# Patient Record
Sex: MALE | Race: WHITE | NOT HISPANIC OR LATINO | Employment: UNEMPLOYED | URBAN - NONMETROPOLITAN AREA
[De-identification: names, ages, dates, MRNs, and addresses within clinical notes are randomized per-mention and may not be internally consistent; named-entity substitution may affect disease eponyms.]

---

## 2022-11-16 ENCOUNTER — OFFICE VISIT (OUTPATIENT)
Dept: FAMILY MEDICINE CLINIC | Facility: CLINIC | Age: 47
End: 2022-11-16

## 2022-11-16 VITALS
DIASTOLIC BLOOD PRESSURE: 75 MMHG | HEIGHT: 72 IN | SYSTOLIC BLOOD PRESSURE: 115 MMHG | WEIGHT: 231.7 LBS | OXYGEN SATURATION: 99 % | BODY MASS INDEX: 31.38 KG/M2 | HEART RATE: 79 BPM

## 2022-11-16 DIAGNOSIS — E78.2 MIXED HYPERLIPIDEMIA: ICD-10-CM

## 2022-11-16 DIAGNOSIS — F41.9 ANXIETY: ICD-10-CM

## 2022-11-16 DIAGNOSIS — F51.04 PSYCHOPHYSIOLOGICAL INSOMNIA: ICD-10-CM

## 2022-11-16 DIAGNOSIS — Z00.00 ROUTINE GENERAL MEDICAL EXAMINATION AT A HEALTH CARE FACILITY: Primary | ICD-10-CM

## 2022-11-16 DIAGNOSIS — E11.9 TYPE 2 DIABETES MELLITUS WITHOUT COMPLICATION, WITHOUT LONG-TERM CURRENT USE OF INSULIN: ICD-10-CM

## 2022-11-16 DIAGNOSIS — F10.20 ALCOHOLISM: ICD-10-CM

## 2022-11-16 DIAGNOSIS — K21.9 GASTROESOPHAGEAL REFLUX DISEASE WITHOUT ESOPHAGITIS: ICD-10-CM

## 2022-11-16 DIAGNOSIS — Z72.0 TOBACCO ABUSE: ICD-10-CM

## 2022-11-16 DIAGNOSIS — M1A.09X0 IDIOPATHIC CHRONIC GOUT OF MULTIPLE SITES WITHOUT TOPHUS: ICD-10-CM

## 2022-11-16 DIAGNOSIS — Z98.84 HISTORY OF BARIATRIC SURGERY: ICD-10-CM

## 2022-11-16 DIAGNOSIS — E53.8 B12 DEFICIENCY: ICD-10-CM

## 2022-11-16 PROCEDURE — 99386 PREV VISIT NEW AGE 40-64: CPT | Performed by: FAMILY MEDICINE

## 2022-11-16 PROCEDURE — 36415 COLL VENOUS BLD VENIPUNCTURE: CPT | Performed by: FAMILY MEDICINE

## 2022-11-16 PROCEDURE — 3008F BODY MASS INDEX DOCD: CPT | Performed by: FAMILY MEDICINE

## 2022-11-16 PROCEDURE — 2014F MENTAL STATUS ASSESS: CPT | Performed by: FAMILY MEDICINE

## 2022-11-16 RX ORDER — VARENICLINE TARTRATE 1 MG/1
1 TABLET, FILM COATED ORAL 2 TIMES DAILY
Qty: 56 TABLET | Refills: 1 | Status: SHIPPED | OUTPATIENT
Start: 2022-12-14 | End: 2023-02-08

## 2022-11-16 RX ORDER — ASPIRIN 81 MG
TABLET,CHEWABLE ORAL
COMMUNITY
Start: 2022-10-17

## 2022-11-16 RX ORDER — NICOTINE POLACRILEX 4 MG/1
GUM, CHEWING ORAL
COMMUNITY
End: 2023-01-04

## 2022-11-16 RX ORDER — ATORVASTATIN CALCIUM 80 MG/1
TABLET, FILM COATED ORAL
COMMUNITY
End: 2023-03-22 | Stop reason: SDUPTHER

## 2022-11-16 RX ORDER — TRAZODONE HYDROCHLORIDE 100 MG/1
100 TABLET ORAL NIGHTLY
Qty: 30 TABLET | Refills: 3 | Status: SHIPPED | OUTPATIENT
Start: 2022-11-16 | End: 2023-01-04

## 2022-11-16 RX ORDER — ALLOPURINOL 100 MG/1
TABLET ORAL
COMMUNITY
Start: 2022-10-17 | End: 2023-03-22 | Stop reason: SDUPTHER

## 2022-11-16 RX ORDER — BUSPIRONE HYDROCHLORIDE 15 MG/1
TABLET ORAL
COMMUNITY
Start: 2022-09-23 | End: 2023-03-22 | Stop reason: SDUPTHER

## 2022-11-16 RX ORDER — NALTREXONE HYDROCHLORIDE 50 MG/1
50 TABLET, FILM COATED ORAL DAILY
Qty: 30 TABLET | Refills: 3 | Status: SHIPPED | OUTPATIENT
Start: 2022-11-16 | End: 2023-03-22 | Stop reason: SDUPTHER

## 2022-11-16 NOTE — PROGRESS NOTES
"Chief Complaint  Annual Exam    Subjective          Chele Khan presents to [facility]  History of Present Illness  Patient comes in says he has long history of different medical problems and other difficulties he does have diabetes he has hyperlipidemia he does smoke he has a history of some back issues and muscular pains as well he also reports he is an alcoholic that basically has not taper down his drinking about 6 drinks a day he says he is says mostly at beer he says he was completely get off that he says the reason he drinks at night he keeps because he cannot sleep he said he said chronic insomnia he used to take trazodone which helped quite a bit but he has been out of it for a while and would like to restart that he is also basically says he continues to smoke and he would like to get to get some Chantix eventually to quit smoking as well he states that he would also like to get some blood work done he continues to see cardiology for his hyperlipidemia he reports another problems      Objective   Vital Signs:   /75   Pulse 79   Ht 182.9 cm (72\")   Wt 105 kg (231 lb 11.2 oz)   SpO2 99%   BMI 31.42 kg/m²     Body mass index is 31.42 kg/m².    Review of Systems   Constitutional: Negative.    HENT: Negative for congestion, dental problem, ear discharge, ear pain and sore throat.    Respiratory: Negative for apnea, chest tightness and shortness of breath.    Gastrointestinal: Negative for constipation and nausea.   Endocrine: Negative for polyuria.   Genitourinary: Negative for difficulty urinating.   Musculoskeletal: Positive for arthralgias. Negative for gait problem.   Skin: Negative for rash.   Hematological: Negative for adenopathy.   Psychiatric/Behavioral: Positive for sleep disturbance and depressed mood. The patient is nervous/anxious.        Past History:  Medical History: has a past medical history of Diabetes mellitus (HCC) and Hypertension.   Surgical " History: has a past surgical history that includes Cardiac catheterization; Coronary stent placement; and Bariatric Surgery.         Current Outpatient Medications:   •  allopurinol (ZYLOPRIM) 100 MG tablet, , Disp: , Rfl:   •  Aspirin Low Dose 81 MG chewable tablet, , Disp: , Rfl:   •  atorvastatin (LIPITOR) 80 MG tablet, , Disp: , Rfl:   •  busPIRone (BUSPAR) 15 MG tablet, , Disp: , Rfl:   •  metFORMIN (GLUCOPHAGE) 1000 MG tablet, , Disp: , Rfl:   •  Omeprazole 20 MG tablet delayed-release, , Disp: , Rfl:   •  naltrexone (DEPADE) 50 MG tablet, Take 1 tablet by mouth Daily., Disp: 30 tablet, Rfl: 3  •  traZODone (DESYREL) 100 MG tablet, Take 1 tablet by mouth Every Night., Disp: 30 tablet, Rfl: 3  •  [START ON 12/14/2022] varenicline (Chantix Continuing Month Benjamin) 1 MG tablet, Take 1 tablet by mouth 2 (Two) Times a Day for 56 days., Disp: 56 tablet, Rfl: 1    Allergies: Patient has no known allergies.    Physical Exam  Vitals reviewed.   Constitutional:       Appearance: Normal appearance.   HENT:      Head: Normocephalic.      Right Ear: Tympanic membrane, ear canal and external ear normal.      Left Ear: Tympanic membrane, ear canal and external ear normal.      Nose: Nose normal.      Mouth/Throat:      Pharynx: Oropharynx is clear.   Eyes:      Pupils: Pupils are equal, round, and reactive to light.   Cardiovascular:      Rate and Rhythm: Normal rate and regular rhythm.      Pulses: Normal pulses.   Pulmonary:      Effort: Pulmonary effort is normal.      Breath sounds: Normal breath sounds.      Comments: Expiratory wheezes  Abdominal:      General: Abdomen is flat. Bowel sounds are normal.      Palpations: Abdomen is soft.   Musculoskeletal:         General: Normal range of motion.   Skin:     General: Skin is warm and dry.   Neurological:      General: No focal deficit present.      Mental Status: He is alert and oriented to person, place, and time.          Result Review :                   Assessment and  Plan    Diagnoses and all orders for this visit:    1. Routine general medical examination at a health care facility (Primary)  Comments:  Patient comes in says he wants to quit smoking quit drinking get blood work done in establish care  Orders:  -     Comprehensive Metabolic Panel; Future    2. Mixed hyperlipidemia  Comments:  Cardiology taking medications we will get blood work  Orders:  -     Lipid Panel; Future    3. Gastroesophageal reflux disease without esophagitis  Comments:  Stable presently    4. Type 2 diabetes mellitus without complication, without long-term current use of insulin (HCC)  Comments:  Much improved since gastric bypass surgery still taking metformin we will get levels  Orders:  -     Hemoglobin A1c; Future    5. Idiopathic chronic gout of multiple sites without tophus  Comments:  Uric acid level and monitor  Orders:  -     Uric Acid; Future    6. Tobacco abuse  Comments:  Discussed smoking cessation we will try some Chantix to see how he does  Orders:  -     varenicline (Chantix Continuing Month Benjamin) 1 MG tablet; Take 1 tablet by mouth 2 (Two) Times a Day for 56 days.  Dispense: 56 tablet; Refill: 1    7. Alcoholism (HCC)  Comments:  Patient states he is never had significant seizures or withdrawal he has reduced his drinking down to 6 drinks a day we will go ahead and try some naltrexone   Orders:  -     CBC & Differential; Future  -     naltrexone (DEPADE) 50 MG tablet; Take 1 tablet by mouth Daily.  Dispense: 30 tablet; Refill: 3    8. Psychophysiological insomnia  Comments:  We will start some trazodone  Orders:  -     traZODone (DESYREL) 100 MG tablet; Take 1 tablet by mouth Every Night.  Dispense: 30 tablet; Refill: 3    9. Anxiety  Comments:  He is on as needed BuSpar    10. History of bariatric surgery  Comments:  Stable  Orders:  -     Vitamin B12; Future    11. B12 deficiency  Comments:  Replacing p.o. we will see what his levels is and go from there  Orders:  -     Vitamin B12;  Future              Follow Up   No follow-ups on file.  Patient was given instructions and counseling regarding his condition or for health maintenance advice. Please see specific information pulled into the AVS if appropriate.     Hany Cameron MD

## 2022-11-17 LAB
ALBUMIN SERPL-MCNC: 4.3 G/DL (ref 4–5)
ALBUMIN/GLOB SERPL: 1.9 {RATIO} (ref 1.2–2.2)
ALP SERPL-CCNC: 132 IU/L (ref 44–121)
ALT SERPL-CCNC: 53 IU/L (ref 0–44)
AST SERPL-CCNC: 73 IU/L (ref 0–40)
BASOPHILS # BLD AUTO: 0.1 X10E3/UL (ref 0–0.2)
BASOPHILS NFR BLD AUTO: 1 %
BILIRUB SERPL-MCNC: 0.5 MG/DL (ref 0–1.2)
BUN SERPL-MCNC: 8 MG/DL (ref 6–24)
BUN/CREAT SERPL: 11 (ref 9–20)
CALCIUM SERPL-MCNC: 8.9 MG/DL (ref 8.7–10.2)
CHLORIDE SERPL-SCNC: 105 MMOL/L (ref 96–106)
CHOLEST SERPL-MCNC: 137 MG/DL (ref 100–199)
CO2 SERPL-SCNC: 22 MMOL/L (ref 20–29)
CREAT SERPL-MCNC: 0.76 MG/DL (ref 0.76–1.27)
EGFRCR SERPLBLD CKD-EPI 2021: 112 ML/MIN/1.73
EOSINOPHIL # BLD AUTO: 0.3 X10E3/UL (ref 0–0.4)
EOSINOPHIL NFR BLD AUTO: 5 %
ERYTHROCYTE [DISTWIDTH] IN BLOOD BY AUTOMATED COUNT: 12.9 % (ref 11.6–15.4)
GLOBULIN SER CALC-MCNC: 2.3 G/DL (ref 1.5–4.5)
GLUCOSE SERPL-MCNC: 74 MG/DL (ref 70–99)
HBA1C MFR BLD: 5.1 % (ref 4.8–5.6)
HCT VFR BLD AUTO: 47.3 % (ref 37.5–51)
HDLC SERPL-MCNC: 46 MG/DL
HGB BLD-MCNC: 16 G/DL (ref 13–17.7)
IMM GRANULOCYTES # BLD AUTO: 0 X10E3/UL (ref 0–0.1)
IMM GRANULOCYTES NFR BLD AUTO: 0 %
LDLC SERPL CALC-MCNC: 68 MG/DL (ref 0–99)
LYMPHOCYTES # BLD AUTO: 2.9 X10E3/UL (ref 0.7–3.1)
LYMPHOCYTES NFR BLD AUTO: 49 %
MCH RBC QN AUTO: 33.1 PG (ref 26.6–33)
MCHC RBC AUTO-ENTMCNC: 33.8 G/DL (ref 31.5–35.7)
MCV RBC AUTO: 98 FL (ref 79–97)
MONOCYTES # BLD AUTO: 0.7 X10E3/UL (ref 0.1–0.9)
MONOCYTES NFR BLD AUTO: 12 %
NEUTROPHILS # BLD AUTO: 1.9 X10E3/UL (ref 1.4–7)
NEUTROPHILS NFR BLD AUTO: 33 %
PLATELET # BLD AUTO: 204 X10E3/UL (ref 150–450)
POTASSIUM SERPL-SCNC: 4.1 MMOL/L (ref 3.5–5.2)
PROT SERPL-MCNC: 6.6 G/DL (ref 6–8.5)
RBC # BLD AUTO: 4.84 X10E6/UL (ref 4.14–5.8)
SODIUM SERPL-SCNC: 143 MMOL/L (ref 134–144)
TRIGL SERPL-MCNC: 128 MG/DL (ref 0–149)
URATE SERPL-MCNC: 7.1 MG/DL (ref 3.8–8.4)
VIT B12 SERPL-MCNC: 727 PG/ML (ref 232–1245)
VLDLC SERPL CALC-MCNC: 23 MG/DL (ref 5–40)
WBC # BLD AUTO: 5.8 X10E3/UL (ref 3.4–10.8)

## 2023-01-04 ENCOUNTER — OFFICE VISIT (OUTPATIENT)
Dept: FAMILY MEDICINE CLINIC | Facility: CLINIC | Age: 48
End: 2023-01-04
Payer: COMMERCIAL

## 2023-01-04 VITALS — HEIGHT: 73 IN | WEIGHT: 221 LBS | BODY MASS INDEX: 29.29 KG/M2 | HEART RATE: 104 BPM | OXYGEN SATURATION: 98 %

## 2023-01-04 DIAGNOSIS — K29.20 ACUTE ALCOHOLIC GASTRITIS WITHOUT HEMORRHAGE: Primary | ICD-10-CM

## 2023-01-04 DIAGNOSIS — G47.00 PERSISTENT INSOMNIA: ICD-10-CM

## 2023-01-04 DIAGNOSIS — Z78.9 ALCOHOL USE: ICD-10-CM

## 2023-01-04 PROBLEM — M54.2 NECK PAIN: Status: ACTIVE | Noted: 2021-04-23

## 2023-01-04 PROBLEM — F10.90 ALCOHOL USE: Status: ACTIVE | Noted: 2023-01-04

## 2023-01-04 PROBLEM — M54.9 BACK PAIN: Status: ACTIVE | Noted: 2017-11-27

## 2023-01-04 PROCEDURE — 99213 OFFICE O/P EST LOW 20 MIN: CPT | Performed by: PHYSICIAN ASSISTANT

## 2023-01-04 RX ORDER — NALTREXONE HYDROCHLORIDE 50 MG/1
50 TABLET, FILM COATED ORAL 2 TIMES DAILY
Qty: 60 TABLET | Refills: 1 | Status: CANCELLED | OUTPATIENT
Start: 2023-01-04

## 2023-01-04 RX ORDER — FAMOTIDINE 40 MG/1
40 TABLET, FILM COATED ORAL NIGHTLY
Qty: 90 TABLET | Refills: 1 | Status: SHIPPED | OUTPATIENT
Start: 2023-01-04 | End: 2023-03-22 | Stop reason: SDUPTHER

## 2023-01-04 RX ORDER — QUETIAPINE FUMARATE 50 MG/1
50 TABLET, FILM COATED ORAL NIGHTLY
Qty: 30 TABLET | Refills: 3 | Status: SHIPPED | OUTPATIENT
Start: 2023-01-04 | End: 2023-03-22 | Stop reason: SDUPTHER

## 2023-01-04 RX ORDER — OMEPRAZOLE 40 MG/1
40 CAPSULE, DELAYED RELEASE ORAL DAILY
Qty: 90 CAPSULE | Refills: 3 | Status: SHIPPED | OUTPATIENT
Start: 2023-01-04 | End: 2023-03-22 | Stop reason: SDUPTHER

## 2023-01-04 NOTE — PROGRESS NOTES
Chief Complaint  Insomnia (Patient states the med Nisha put him on is not working.) and Abdominal Pain (X1 month)    Subjective          Chele Khan presents to [facility]  History of Present Illness patient states he is here because the medications are Dr. Cameron gave him for sleep are not working and he like to try something else.  He states that because he cannot sleep he has been drinking more again and he now thinks that if he could sleep he would not drink as much and that would help and quit drinking altogether.  He also has had epigastric abdominal pain for the past month because of his drinking.  He says he averages about 20 beers a day and if he tries to eat anything he has burning pain in his abdomen.  Food also makes him nauseated.  He quit taking the naltrexone because he did not think it was helping to curb his desire for the beer.  He denies drinking anything heavier than malted beer.  He denies any hematemesis or melena.    Objective   Vital Signs:   Pulse 104   Ht 185.4 cm (73\")   Wt 100 kg (221 lb)   SpO2 98%   BMI 29.16 kg/m²     Body mass index is 29.16 kg/m².    Review of Systems   Constitutional: Negative for fatigue.   Eyes: Negative for blurred vision.   Respiratory: Negative for cough, chest tightness and shortness of breath.    Cardiovascular: Negative for chest pain, palpitations and leg swelling.   Gastrointestinal: Positive for abdominal pain, nausea, GERD and indigestion. Negative for abdominal distention, blood in stool, constipation, diarrhea and vomiting.   Neurological: Negative for dizziness, tremors and headache.   Psychiatric/Behavioral: Negative for depressed mood. The patient is not nervous/anxious.        Past History:  Medical History: has a past medical history of Diabetes mellitus (HCC) and Hypertension.   Surgical History: has a past surgical history that includes Cardiac catheterization; Coronary stent placement; and Bariatric  Surgery.   Family History: family history is not on file.   Social History: reports that he has been smoking cigarettes. He started smoking about 38 years ago. He has a 30.00 pack-year smoking history. He has never used smokeless tobacco. He reports current alcohol use of about 145.0 standard drinks per week. He reports that he does not use drugs.      Current Outpatient Medications:   •  allopurinol (ZYLOPRIM) 100 MG tablet, , Disp: , Rfl:   •  Aspirin Low Dose 81 MG chewable tablet, , Disp: , Rfl:   •  atorvastatin (LIPITOR) 80 MG tablet, , Disp: , Rfl:   •  busPIRone (BUSPAR) 15 MG tablet, , Disp: , Rfl:   •  metFORMIN (GLUCOPHAGE) 1000 MG tablet, , Disp: , Rfl:   •  varenicline (Chantix Continuing Month Benjamin) 1 MG tablet, Take 1 tablet by mouth 2 (Two) Times a Day for 56 days., Disp: 56 tablet, Rfl: 1  •  famotidine (PEPCID) 40 MG tablet, Take 1 tablet by mouth Every Night., Disp: 90 tablet, Rfl: 1  •  naltrexone (DEPADE) 50 MG tablet, Take 1 tablet by mouth Daily., Disp: 30 tablet, Rfl: 3  •  omeprazole (priLOSEC) 40 MG capsule, Take 1 capsule by mouth Daily., Disp: 90 capsule, Rfl: 3  •  QUEtiapine (SEROquel) 50 MG tablet, Take 1 tablet by mouth Every Night., Disp: 30 tablet, Rfl: 3    Allergies: Patient has no known allergies.    Physical Exam  Vitals reviewed.   Constitutional:       Appearance: Normal appearance.   Cardiovascular:      Rate and Rhythm: Normal rate and regular rhythm.      Heart sounds: Normal heart sounds.   Pulmonary:      Effort: Pulmonary effort is normal.      Breath sounds: Normal breath sounds.   Abdominal:      General: Bowel sounds are normal. There is no distension.      Palpations: Abdomen is soft.      Tenderness: There is no abdominal tenderness. There is no guarding or rebound.      Comments: No signs of ascites.    Musculoskeletal:         General: Normal range of motion.   Neurological:      General: No focal deficit present.      Mental Status: He is alert and oriented to  person, place, and time.   Psychiatric:         Mood and Affect: Mood normal.          Result Review :                   Assessment and Plan    Diagnoses and all orders for this visit:    1. Acute alcoholic gastritis without hemorrhage (Primary)  Assessment & Plan:  I suspect his abdominal pain is from alcoholic gastritis I am increasing his omeprazole to 40 mg daily and adding famotidine at bedtime as well as referring him to gastroenterology for further evaluation.      Orders:  -     Ambulatory Referral to Gastroenterology    2. Persistent insomnia  Assessment & Plan:  I explained to him that the alcohol really is not helping him sleep and that it may be contributing to his sleep issues and that he has to drink enough to make himself pass out.  He does not think the trazodone helps at all so I agreed to switch to low-dose quetiapine which may help some of his anxiety but I told him the most important thing was going to be to get off the alcohol if he wants to get back to normal sleep pattern.      3. Alcohol use  Assessment & Plan:  Patient admits to excessive drinking he was placed on naltrexone to help with this but he stopped taking it.  I suggested that he resume the naltrexone and that he consider going to rehab again if he is serious about quitting.  Patient states that he will try the naltrexone again and if the changes we are making do not help then he will agree to seek a place for rehabilitation.      Other orders  -     omeprazole (priLOSEC) 40 MG capsule; Take 1 capsule by mouth Daily.  Dispense: 90 capsule; Refill: 3  -     QUEtiapine (SEROquel) 50 MG tablet; Take 1 tablet by mouth Every Night.  Dispense: 30 tablet; Refill: 3  -     famotidine (PEPCID) 40 MG tablet; Take 1 tablet by mouth Every Night.  Dispense: 90 tablet; Refill: 1      Follow Up   Return in about 4 weeks (around 2/1/2023) for with Dr. Cameron if possible.  Patient was given instructions and counseling regarding his condition or for  health maintenance advice. Please see specific information pulled into the AVS if appropriate.     Kimber Zacarias PA-C

## 2023-01-04 NOTE — ASSESSMENT & PLAN NOTE
I explained to him that the alcohol really is not helping him sleep and that it may be contributing to his sleep issues and that he has to drink enough to make himself pass out.  He does not think the trazodone helps at all so I agreed to switch to low-dose quetiapine which may help some of his anxiety but I told him the most important thing was going to be to get off the alcohol if he wants to get back to normal sleep pattern.

## 2023-01-04 NOTE — ASSESSMENT & PLAN NOTE
Patient admits to excessive drinking he was placed on naltrexone to help with this but he stopped taking it.  I suggested that he resume the naltrexone and that he consider going to rehab again if he is serious about quitting.  Patient states that he will try the naltrexone again and if the changes we are making do not help then he will agree to seek a place for rehabilitation.

## 2023-01-04 NOTE — ASSESSMENT & PLAN NOTE
I suspect his abdominal pain is from alcoholic gastritis I am increasing his omeprazole to 40 mg daily and adding famotidine at bedtime as well as referring him to gastroenterology for further evaluation.

## 2023-03-16 DIAGNOSIS — F10.20 ALCOHOLISM: ICD-10-CM

## 2023-03-16 RX ORDER — NALTREXONE HYDROCHLORIDE 50 MG/1
50 TABLET, FILM COATED ORAL DAILY
Qty: 30 TABLET | Refills: 3 | OUTPATIENT
Start: 2023-03-16

## 2023-03-16 RX ORDER — ATORVASTATIN CALCIUM 80 MG/1
TABLET, FILM COATED ORAL
Qty: 90 TABLET | OUTPATIENT
Start: 2023-03-16

## 2023-03-16 RX ORDER — FAMOTIDINE 40 MG/1
40 TABLET, FILM COATED ORAL NIGHTLY
Qty: 90 TABLET | Refills: 1 | OUTPATIENT
Start: 2023-03-16

## 2023-03-16 RX ORDER — BUSPIRONE HYDROCHLORIDE 15 MG/1
TABLET ORAL
OUTPATIENT
Start: 2023-03-16

## 2023-03-16 RX ORDER — QUETIAPINE FUMARATE 50 MG/1
50 TABLET, FILM COATED ORAL NIGHTLY
Qty: 30 TABLET | Refills: 3 | OUTPATIENT
Start: 2023-03-16

## 2023-03-16 RX ORDER — ALLOPURINOL 100 MG/1
TABLET ORAL
OUTPATIENT
Start: 2023-03-16

## 2023-03-16 RX ORDER — OMEPRAZOLE 40 MG/1
40 CAPSULE, DELAYED RELEASE ORAL DAILY
Qty: 90 CAPSULE | Refills: 3 | OUTPATIENT
Start: 2023-03-16

## 2023-03-16 NOTE — TELEPHONE ENCOUNTER
Caller: Chele Khan    Relationship: Self    Best call back number: [phone]    Requested Prescriptions:   Requested Prescriptions     Pending Prescriptions Disp Refills   • allopurinol (ZYLOPRIM) 100 MG tablet     • metFORMIN (GLUCOPHAGE) 1000 MG tablet     • busPIRone (BUSPAR) 15 MG tablet     • famotidine (PEPCID) 40 MG tablet 90 tablet 1     Sig: Take 1 tablet by mouth Every Night.   • atorvastatin (LIPITOR) 80 MG tablet 90 tablet    • naltrexone (DEPADE) 50 MG tablet 30 tablet 3     Sig: Take 1 tablet by mouth Daily.   • omeprazole (priLOSEC) 40 MG capsule 90 capsule 3     Sig: Take 1 capsule by mouth Daily.   • QUEtiapine (SEROquel) 50 MG tablet 30 tablet 3     Sig: Take 1 tablet by mouth Every Night.      METOPROLOL 25 MG TAKEN 2 TIMES PER DAY  TIZANADINE  2 MG TAKEN 3 TIMES PER DAY    Pharmacy where request should be sent: [address] - [phone] [facility] [phone] FX     Additional details provided by patient: PLEASE REFILL AS 90 DAY SUPPLY WITH REFILLS    Does the patient have less than a 3 day supply:  [x] Yes  [] No    Would you like a call back once the refill request has been completed: [] Yes [] No    If the office needs to give you a call back, can they leave a voicemail: [] Yes [] No    Ernst Viramontes   03/16/23 15:06 EDT

## 2023-03-22 ENCOUNTER — TELEPHONE (OUTPATIENT)
Dept: FAMILY MEDICINE CLINIC | Facility: CLINIC | Age: 48
End: 2023-03-22
Payer: COMMERCIAL

## 2023-03-22 DIAGNOSIS — F10.20 ALCOHOLISM: ICD-10-CM

## 2023-03-22 RX ORDER — ATORVASTATIN CALCIUM 80 MG/1
80 TABLET, FILM COATED ORAL NIGHTLY
Qty: 60 TABLET | Refills: 0 | Status: SHIPPED | OUTPATIENT
Start: 2023-03-22

## 2023-03-22 RX ORDER — CEPHALEXIN 500 MG/1
CAPSULE ORAL
COMMUNITY
Start: 2023-02-27

## 2023-03-22 RX ORDER — OMEPRAZOLE 40 MG/1
40 CAPSULE, DELAYED RELEASE ORAL DAILY
Qty: 90 CAPSULE | Refills: 3 | Status: SHIPPED | OUTPATIENT
Start: 2023-03-22

## 2023-03-22 RX ORDER — QUETIAPINE FUMARATE 50 MG/1
50 TABLET, FILM COATED ORAL NIGHTLY
Qty: 30 TABLET | Refills: 3 | Status: SHIPPED | OUTPATIENT
Start: 2023-03-22

## 2023-03-22 RX ORDER — NALTREXONE HYDROCHLORIDE 50 MG/1
50 TABLET, FILM COATED ORAL DAILY
Qty: 30 TABLET | Refills: 3 | Status: SHIPPED | OUTPATIENT
Start: 2023-03-22

## 2023-03-22 RX ORDER — BUSPIRONE HYDROCHLORIDE 15 MG/1
15 TABLET ORAL DAILY
Qty: 30 TABLET | Refills: 0 | Status: SHIPPED | OUTPATIENT
Start: 2023-03-22

## 2023-03-22 RX ORDER — FAMOTIDINE 40 MG/1
40 TABLET, FILM COATED ORAL NIGHTLY
Qty: 90 TABLET | Refills: 1 | Status: SHIPPED | OUTPATIENT
Start: 2023-03-22

## 2023-03-22 RX ORDER — ALLOPURINOL 100 MG/1
100 TABLET ORAL DAILY
Qty: 60 TABLET | Refills: 0 | Status: SHIPPED | OUTPATIENT
Start: 2023-03-22

## 2023-03-22 NOTE — TELEPHONE ENCOUNTER
WIFE CALLED AND SAID HE'S OUT OF HIS MEDICATIONS. SHE SAID SHE'S GIVEN THE LIST OF MEDS HE NEEDS WITH SOMEONE HERE 3 TIMES THIS WEEK AND WOULD NOT TELL ME WHAT HE NEEDS. HE SCHEDULED AN APPOINTMENT FOR 4-6-2023. HE IS COMPLETELY OUT OF HIS MEDS.

## 2023-04-06 ENCOUNTER — TELEPHONE (OUTPATIENT)
Dept: FAMILY MEDICINE CLINIC | Facility: CLINIC | Age: 48
End: 2023-04-06

## 2023-04-06 NOTE — TELEPHONE ENCOUNTER
CRAIG PATIENT  Called and talked to Anne, explained no more refills will be called in until patient makes and keeps an office visit with Dr Cameron.

## 2023-04-17 ENCOUNTER — TELEPHONE (OUTPATIENT)
Dept: FAMILY MEDICINE CLINIC | Facility: CLINIC | Age: 48
End: 2023-04-17

## 2023-04-17 ENCOUNTER — TELEPHONE (OUTPATIENT)
Dept: FAMILY MEDICINE CLINIC | Facility: CLINIC | Age: 48
End: 2023-04-17
Payer: COMMERCIAL

## 2023-04-17 NOTE — TELEPHONE ENCOUNTER
Caller: BRIANNE VELEZ    Relationship: Emergency Contact    Best call back number: [phone]    What medication are you requesting: PAIN MEDICATION FOR DVT    What are your current symptoms: RIGHT LEG PAIN    How long have you been experiencing symptoms: 2 WEEKS    Have you had these symptoms before:    [] Yes  [x] No    Have you been treated for these symptoms before:   [] Yes  [x] No    If a prescription is needed, what is your preferred pharmacy and phone number: [address] [phone] [facility] [phone] FX     Additional notes: THE PATIENT WENT TO [facility] ON  4.15.23 AND WAS DIAGNOSED WITH A BLOOD CLOT.     PLEASE CALL TO [facility] AS SOON AS POSSIBLE.     THANK YOU.

## 2023-04-17 NOTE — TELEPHONE ENCOUNTER
Wife called and said she thinks he needs to be seen before the 28th for the blood clot  And he is in a lot of pain and nsaids are not working  She is wanting a call back

## 2023-04-18 NOTE — TELEPHONE ENCOUNTER
PT SPOUSE CALLED TO CHECK ON STATUS FOR MED REQUEST, PT HAS NOT HEARD BACK FROM ANYONE.    PLEASE ADVISE.CALL BACK:[phone]

## 2023-04-24 ENCOUNTER — OFFICE VISIT (OUTPATIENT)
Dept: FAMILY MEDICINE CLINIC | Facility: CLINIC | Age: 48
End: 2023-04-24
Payer: COMMERCIAL

## 2023-04-24 VITALS
HEIGHT: 73 IN | HEART RATE: 68 BPM | OXYGEN SATURATION: 99 % | BODY MASS INDEX: 31.45 KG/M2 | SYSTOLIC BLOOD PRESSURE: 130 MMHG | WEIGHT: 237.3 LBS | DIASTOLIC BLOOD PRESSURE: 75 MMHG

## 2023-04-24 DIAGNOSIS — M79.604 RIGHT LEG PAIN: ICD-10-CM

## 2023-04-24 DIAGNOSIS — I82.431 ACUTE DEEP VEIN THROMBOSIS (DVT) OF POPLITEAL VEIN OF RIGHT LOWER EXTREMITY: Primary | ICD-10-CM

## 2023-04-24 DIAGNOSIS — E11.9 TYPE 2 DIABETES MELLITUS WITHOUT COMPLICATION, WITHOUT LONG-TERM CURRENT USE OF INSULIN: ICD-10-CM

## 2023-04-24 DIAGNOSIS — F10.20 ALCOHOLISM: ICD-10-CM

## 2023-04-24 RX ORDER — RIVAROXABAN 20 MG/1
TABLET, FILM COATED ORAL
COMMUNITY
Start: 2023-04-16

## 2023-04-24 RX ORDER — GABAPENTIN 100 MG/1
100 CAPSULE ORAL 3 TIMES DAILY
Qty: 90 CAPSULE | Refills: 0 | Status: SHIPPED | OUTPATIENT
Start: 2023-04-24

## 2023-05-04 ENCOUNTER — OFFICE VISIT (OUTPATIENT)
Dept: FAMILY MEDICINE CLINIC | Facility: CLINIC | Age: 48
End: 2023-05-04
Payer: COMMERCIAL

## 2023-05-04 VITALS
OXYGEN SATURATION: 99 % | HEIGHT: 72 IN | WEIGHT: 236 LBS | HEART RATE: 72 BPM | SYSTOLIC BLOOD PRESSURE: 118 MMHG | DIASTOLIC BLOOD PRESSURE: 60 MMHG | BODY MASS INDEX: 31.97 KG/M2

## 2023-05-04 DIAGNOSIS — E11.9 TYPE 2 DIABETES MELLITUS WITHOUT COMPLICATION, WITHOUT LONG-TERM CURRENT USE OF INSULIN: ICD-10-CM

## 2023-05-04 DIAGNOSIS — M79.604 RIGHT LEG PAIN: ICD-10-CM

## 2023-05-04 DIAGNOSIS — I82.431 ACUTE DEEP VEIN THROMBOSIS (DVT) OF POPLITEAL VEIN OF RIGHT LOWER EXTREMITY: Primary | ICD-10-CM

## 2023-05-04 DIAGNOSIS — H93.13 TINNITUS OF BOTH EARS: ICD-10-CM

## 2023-05-04 DIAGNOSIS — F10.20 ALCOHOLISM: ICD-10-CM

## 2023-05-04 RX ORDER — GABAPENTIN 100 MG/1
100 CAPSULE ORAL 3 TIMES DAILY
Qty: 90 CAPSULE | Refills: 3 | Status: SHIPPED | OUTPATIENT
Start: 2023-05-04

## 2023-05-04 NOTE — PROGRESS NOTES
"Chief Complaint  Tinnitus (/) and Follow-up on blood clot    Subjective          Chele Khan presents to [facility]  History of Present Illness  Patient comes in today saying his leg pain and things is much improved he is transitioning over to his once a day of his blood thinners he says that the pain in the legs got a lot better the swelling is gotten a lot better and he states the gabapentin has helped he says he has noticed that he has had a lot of tenderness that he has had for some period of time and recently says he thinks it may have gotten worse the second thing is that he does periodically get drawing in his toes and having some numb sensation they are mostly in his toes bilaterally.  Patient states he does continue to drink but he has decreased his mild consumption by quite a bit he states otherwise he has been doing relatively well  Tinnitus  Associated symptoms include arthralgias and numbness. Pertinent negatives include no congestion, nausea, rash or sore throat.       Objective   Vital Signs:   /60 (BP Location: Left arm, Patient Position: Sitting, Cuff Size: Adult)   Pulse 72   Ht 182.9 cm (72\")   Wt 107 kg (236 lb)   SpO2 99%   BMI 32.01 kg/m²     Body mass index is 32.01 kg/m².    Review of Systems   Constitutional: Negative.    HENT: Positive for tinnitus. Negative for congestion, dental problem, ear discharge, ear pain and sore throat.    Respiratory: Negative for apnea, chest tightness and shortness of breath.    Gastrointestinal: Negative for constipation and nausea.   Endocrine: Negative for polyuria.   Genitourinary: Negative for difficulty urinating.   Musculoskeletal: Positive for arthralgias. Negative for gait problem.   Skin: Negative for rash.   Neurological: Positive for numbness.   Hematological: Negative for adenopathy.       Past History:  Medical History: has a past medical history of Diabetes mellitus and Hypertension.   Surgical " History: has a past surgical history that includes Cardiac catheterization; Coronary stent placement; and Bariatric Surgery.         Current Outpatient Medications:   •  gabapentin (NEURONTIN) 100 MG capsule, Take 1 capsule by mouth 3 (Three) Times a Day., Disp: 90 capsule, Rfl: 3  •  allopurinol (ZYLOPRIM) 100 MG tablet, Take 1 tablet by mouth Daily., Disp: 60 tablet, Rfl: 0  •  Aspirin Low Dose 81 MG chewable tablet, , Disp: , Rfl:   •  atorvastatin (LIPITOR) 80 MG tablet, Take 1 tablet by mouth Every Night., Disp: 60 tablet, Rfl: 0  •  busPIRone (BUSPAR) 15 MG tablet, Take 1 tablet by mouth Daily., Disp: 30 tablet, Rfl: 0  •  famotidine (PEPCID) 40 MG tablet, Take 1 tablet by mouth Every Night., Disp: 90 tablet, Rfl: 1  •  metFORMIN (GLUCOPHAGE) 1000 MG tablet, Take 1 tablet by mouth 2 (Two) Times a Day With Meals., Disp: 60 tablet, Rfl: 0  •  metoprolol tartrate (LOPRESSOR) 25 MG tablet, Take 1 tablet by mouth 2 (Two) Times a Day., Disp: 60 tablet, Rfl: 0  •  naltrexone (DEPADE) 50 MG tablet, Take 1 tablet by mouth Daily., Disp: 30 tablet, Rfl: 3  •  omeprazole (priLOSEC) 40 MG capsule, Take 1 capsule by mouth Daily., Disp: 90 capsule, Rfl: 3  •  QUEtiapine (SEROquel) 50 MG tablet, Take 1 tablet by mouth Every Night., Disp: 30 tablet, Rfl: 3  •  Xarelto 20 MG tablet, TAKE ONE TABLET BY MOUTH DAILY WITH FOOD. START AFTER FINISHING 15 MG DOSE., Disp: , Rfl:     Allergies: Patient has no known allergies.    Physical Exam  Constitutional:       Appearance: Normal appearance.   HENT:      Head: Normocephalic.      Right Ear: Tympanic membrane, ear canal and external ear normal.      Left Ear: Tympanic membrane, ear canal and external ear normal.      Nose: Nose normal.      Mouth/Throat:      Pharynx: Oropharynx is clear.   Eyes:      Pupils: Pupils are equal, round, and reactive to light.   Cardiovascular:      Rate and Rhythm: Normal rate and regular rhythm.      Pulses: Normal pulses.   Pulmonary:      Effort:  Pulmonary effort is normal.      Breath sounds: Normal breath sounds.   Abdominal:      General: Abdomen is flat. Bowel sounds are normal.      Palpations: Abdomen is soft.   Musculoskeletal:         General: Normal range of motion.   Skin:     General: Skin is warm and dry.   Neurological:      General: No focal deficit present.      Mental Status: He is alert and oriented to person, place, and time.          Result Review :                   Assessment and Plan    Diagnoses and all orders for this visit:    1. Acute deep vein thrombosis (DVT) of popliteal vein of right lower extremity (Primary)  Comments:  Continue Xarelto his leg is improving we will monitor    2. Type 2 diabetes mellitus without complication, without long-term current use of insulin  Comments:  Stable once again discussed reduction in alcohol    3. Alcoholism  Comments:  The patient states he has reduced his drinking quite a bit discussed basically stopping    4. Tinnitus of both ears  Comments:  Insurance ears appear to be normal today on exam    5. Right leg pain  Comments:  Much improved continue his gabapentin and monitor  Orders:  -     gabapentin (NEURONTIN) 100 MG capsule; Take 1 capsule by mouth 3 (Three) Times a Day.  Dispense: 90 capsule; Refill: 3              Follow Up   Return in about 4 months (around 9/4/2023).  Patient was given instructions and counseling regarding his condition or for health maintenance advice. Please see specific information pulled into the AVS if appropriate.     Hany Cameron MD

## 2023-05-15 RX ORDER — BUSPIRONE HYDROCHLORIDE 15 MG/1
TABLET ORAL
Qty: 30 TABLET | Refills: 0 | Status: SHIPPED | OUTPATIENT
Start: 2023-05-15

## 2023-05-19 RX ORDER — ATORVASTATIN CALCIUM 80 MG/1
TABLET, FILM COATED ORAL
Qty: 60 TABLET | Refills: 0 | Status: SHIPPED | OUTPATIENT
Start: 2023-05-19

## 2023-06-13 NOTE — TELEPHONE ENCOUNTER
Caller: Chele Khan    Relationship: Self    Best call back number: [phone]    Requested Prescriptions:   Requested Prescriptions     Pending Prescriptions Disp Refills   • metoprolol tartrate (LOPRESSOR) 25 MG tablet 60 tablet 0     Sig: Take 1 tablet by mouth 2 (Two) Times a Day.        Pharmacy where request should be sent: [id number] - [place], [place] - [address] - [phone] [facility] [phone]      Last office visit with prescribing clinician: 5/4/2023   Last telemedicine visit with prescribing clinician: Visit date not found   Next office visit with prescribing clinician: Visit date not found     Additional details provided by patient: PATIENT STATES HE HAS BEEN PUT OF MEDICATION FOR 2 DAYS.     PATIENT STATES FOLLOWING THE CLOSURE OF [facility] HE NOW NEEDS HIS [facility].     Ernst Ramírez Rep   06/13/23 15:39 EDT

## 2023-07-26 RX ORDER — FAMOTIDINE 40 MG/1
40 TABLET, FILM COATED ORAL NIGHTLY
Qty: 90 TABLET | Refills: 1 | Status: SHIPPED | OUTPATIENT
Start: 2023-07-26

## 2023-07-26 NOTE — TELEPHONE ENCOUNTER
Caller: Chele Khan    Relationship: Self    Best call back number: [phone]     Requested Prescriptions:   Requested Prescriptions     Pending Prescriptions Disp Refills    metoprolol tartrate (LOPRESSOR) 25 MG tablet 60 tablet 0     Sig: Take 1 tablet by mouth 2 (Two) Times a Day.    famotidine (PEPCID) 40 MG tablet 90 tablet 1     Sig: Take 1 tablet by mouth Every Night.        Pharmacy where request should be sent: [id number] [place], [place] - [address] [place] [address] AT [facility] [address] - [phone]  - [phone]      Last office visit with prescribing clinician: 5/4/2023   Last telemedicine visit with prescribing clinician: Visit date not found   Next office visit with prescribing clinician: Visit date not found     Additional details provided by patient:   PATIENT IS COMPLETLEY OUT OF MEDICATION AND HAS BEEN FOR TWO WEEKS NOW     Does the patient have less than a 3 day supply:  [x] Yes  [] No    Would you like a call back once the refill request has been completed: [] Yes [x] No    If the office needs to give you a call back, can they leave a voicemail: [] Yes [x] No    Ernst Edmond   07/26/23 16:04 EDT

## 2023-08-01 RX ORDER — ATORVASTATIN CALCIUM 80 MG/1
TABLET, FILM COATED ORAL
Qty: 60 TABLET | Refills: 0 | Status: SHIPPED | OUTPATIENT
Start: 2023-08-01

## 2023-08-15 ENCOUNTER — TELEPHONE (OUTPATIENT)
Dept: FAMILY MEDICINE CLINIC | Facility: CLINIC | Age: 48
End: 2023-08-15

## 2023-08-15 NOTE — TELEPHONE ENCOUNTER
Left voicemail for patient to call back, please let him that we dont have eliquis on active medication list

## 2023-08-15 NOTE — TELEPHONE ENCOUNTER
Patient has appointment scheduled tomorrow for follow up fot DVT, patient states he has no insurance and would like to know if we have any samples of eliquis.

## 2023-08-16 ENCOUNTER — OFFICE VISIT (OUTPATIENT)
Dept: FAMILY MEDICINE CLINIC | Facility: CLINIC | Age: 48
End: 2023-08-16
Payer: MEDICAID

## 2023-08-16 ENCOUNTER — TELEPHONE (OUTPATIENT)
Dept: FAMILY MEDICINE CLINIC | Facility: CLINIC | Age: 48
End: 2023-08-16

## 2023-08-16 VITALS
DIASTOLIC BLOOD PRESSURE: 90 MMHG | HEART RATE: 96 BPM | SYSTOLIC BLOOD PRESSURE: 130 MMHG | TEMPERATURE: 98.4 F | WEIGHT: 238.5 LBS | BODY MASS INDEX: 32.3 KG/M2 | HEIGHT: 72 IN | OXYGEN SATURATION: 99 %

## 2023-08-16 DIAGNOSIS — E78.2 MIXED HYPERLIPIDEMIA: ICD-10-CM

## 2023-08-16 DIAGNOSIS — G47.00 PERSISTENT INSOMNIA: ICD-10-CM

## 2023-08-16 DIAGNOSIS — Z13.29 THYROID DISORDER SCREEN: ICD-10-CM

## 2023-08-16 DIAGNOSIS — F41.9 ANXIETY: ICD-10-CM

## 2023-08-16 DIAGNOSIS — Z78.9 ALCOHOL USE: ICD-10-CM

## 2023-08-16 DIAGNOSIS — E11.9 TYPE 2 DIABETES MELLITUS WITHOUT COMPLICATION, WITHOUT LONG-TERM CURRENT USE OF INSULIN: ICD-10-CM

## 2023-08-16 DIAGNOSIS — R05.2 SUBACUTE COUGH: ICD-10-CM

## 2023-08-16 DIAGNOSIS — I82.409 RECURRENT DEEP VEIN THROMBOSIS (DVT): Primary | ICD-10-CM

## 2023-08-16 PROCEDURE — 36415 COLL VENOUS BLD VENIPUNCTURE: CPT | Performed by: PHYSICIAN ASSISTANT

## 2023-08-16 PROCEDURE — 99214 OFFICE O/P EST MOD 30 MIN: CPT | Performed by: PHYSICIAN ASSISTANT

## 2023-08-16 PROCEDURE — 3044F HG A1C LEVEL LT 7.0%: CPT | Performed by: PHYSICIAN ASSISTANT

## 2023-08-16 PROCEDURE — 1159F MED LIST DOCD IN RCRD: CPT | Performed by: PHYSICIAN ASSISTANT

## 2023-08-16 PROCEDURE — 1160F RVW MEDS BY RX/DR IN RCRD: CPT | Performed by: PHYSICIAN ASSISTANT

## 2023-08-16 RX ORDER — BUSPIRONE HYDROCHLORIDE 15 MG/1
15 TABLET ORAL DAILY
Qty: 30 TABLET | Refills: 0 | Status: SHIPPED | OUTPATIENT
Start: 2023-08-16

## 2023-08-16 RX ORDER — RIVAROXABAN 20 MG/1
20 TABLET, FILM COATED ORAL
Qty: 30 TABLET | Refills: 2 | Status: SHIPPED | OUTPATIENT
Start: 2023-09-06 | End: 2023-08-21

## 2023-08-16 RX ORDER — ATORVASTATIN CALCIUM 80 MG/1
80 TABLET, FILM COATED ORAL
Qty: 60 TABLET | Refills: 2 | Status: SHIPPED | OUTPATIENT
Start: 2023-08-16

## 2023-08-16 RX ORDER — WARFARIN SODIUM 5 MG/1
5 TABLET ORAL NIGHTLY
Qty: 30 TABLET | Refills: 0 | Status: SHIPPED | OUTPATIENT
Start: 2023-08-16

## 2023-08-16 RX ORDER — QUETIAPINE FUMARATE 50 MG/1
50 TABLET, FILM COATED ORAL NIGHTLY
Qty: 30 TABLET | Refills: 3 | Status: SHIPPED | OUTPATIENT
Start: 2023-08-16

## 2023-08-16 NOTE — PROGRESS NOTES
Office Note     Name: Chele Khan    : 1975     MRN: [id number]     Chief Complaint  Hospital Follow Up Visit    Subjective     History of Present Illness:  Chele Khan is a 48 y.o. male who presents today for eval after ER visit to [facility] on 2023 for left lower extremity edema.  He states that he was diagnosed with a DVT, which is the second 1 that he has had.  He states that he had RLE DVT 3 to 4 months ago, and was placed on Xarelto for 3 months.  He states that he did well on the medication, but it was stopped after 3 months.  He then developed this DVT.  He states that they prescribed him Eliquis in the ER, but he was unable to get the medication at the pharmacy.  His wife states that they told him they would have to order it.  He denies any known family history of clotting disorders.  He denies any personal history of cancer.    He admits that he is still drinking alcohol, but he is down to 4-5 beers at night.  He has been to rehab and attended AA meetings, but he is having a hard time completely stopping the alcohol.  He states that the naltrexone has helped significantly.  He states that he has been drinking the beers at night to help him sleep when he thinks that he could quit completely if he had something that would help him sleep.  He admits that he has not had the Seroquel in quite some time, but he is not sure why not.  He states that he does not recall having any problems with the medication.    Past Medical History:   Past Medical History:   Diagnosis Date    Diabetes mellitus     Hypertension        Past Surgical History:   Past Surgical History:   Procedure Laterality Date    BARIATRIC SURGERY      CARDIAC CATHETERIZATION      CORONARY STENT PLACEMENT         Family History: History reviewed. No pertinent family history.    Social History:   Social History     Socioeconomic History    Marital status:    Tobacco Use    Smoking status: Every Day     Packs/day: 2.00      "Years: 15.00     Pack years: 30.00     Types: Cigarettes     Start date: 1985    Smokeless tobacco: Never   Vaping Use    Vaping Use: Never used   Substance and Sexual Activity    Alcohol use: Yes     Alcohol/week: 145.0 standard drinks     Types: 145 Cans of beer per week    Drug use: Never    Sexual activity: Defer       Immunizations:   Immunization History   Administered Date(s) Administered    COVID-19 (PFIZER) Purple Cap Monovalent 05/13/2021, 06/03/2021        Medications:     Current Outpatient Medications:     allopurinol (ZYLOPRIM) 100 MG tablet, Take 1 tablet by mouth Daily., Disp: 60 tablet, Rfl: 0    Aspirin Low Dose 81 MG chewable tablet, , Disp: , Rfl:     atorvastatin (LIPITOR) 80 MG tablet, Take 1 tablet by mouth every night at bedtime., Disp: 60 tablet, Rfl: 2    busPIRone (BUSPAR) 15 MG tablet, Take 1 tablet by mouth Daily., Disp: 30 tablet, Rfl: 0    famotidine (PEPCID) 40 MG tablet, Take 1 tablet by mouth Every Night., Disp: 90 tablet, Rfl: 1    gabapentin (NEURONTIN) 100 MG capsule, Take 1 capsule by mouth 3 (Three) Times a Day., Disp: 90 capsule, Rfl: 3    metFORMIN (GLUCOPHAGE) 1000 MG tablet, TAKE 1 TABLET BY MOUTH TWICE DAILY WITH MEALS, Disp: 180 tablet, Rfl: 0    metoprolol tartrate (LOPRESSOR) 25 MG tablet, Take 1 tablet by mouth 2 (Two) Times a Day., Disp: 60 tablet, Rfl: 0    naltrexone (DEPADE) 50 MG tablet, Take 1 tablet by mouth Daily., Disp: 30 tablet, Rfl: 3    omeprazole (priLOSEC) 40 MG capsule, Take 1 capsule by mouth Daily., Disp: 90 capsule, Rfl: 3    QUEtiapine (SEROquel) 50 MG tablet, Take 1 tablet by mouth Every Night., Disp: 30 tablet, Rfl: 3    warfarin (Coumadin) 5 MG tablet, Take 1 tablet by mouth Every Night., Disp: 30 tablet, Rfl: 0    Allergies:   No Known Allergies    Objective     Vital Signs  /90 (BP Location: Right arm, Patient Position: Sitting, Cuff Size: Large Adult)   Pulse 96   Temp 98.4 øF (36.9 øC) (Temporal)   Ht 182.9 cm (72\")   Wt 108 kg (238 " "lb 8 oz)   SpO2 99%   BMI 32.35 kg/mý   Estimated body mass index is 32.35 kg/mý as calculated from the following:    Height as of this encounter: 182.9 cm (72\").    Weight as of this encounter: 108 kg (238 lb 8 oz).      Physical Exam  Vitals and nursing note reviewed.   Constitutional:       General: He is not in acute distress.     Appearance: Normal appearance. He is not ill-appearing.   HENT:      Head: Normocephalic and atraumatic.   Eyes:      General: No scleral icterus.     Extraocular Movements: Extraocular movements intact.      Conjunctiva/sclera: Conjunctivae normal.      Pupils: Pupils are equal, round, and reactive to light.   Cardiovascular:      Rate and Rhythm: Normal rate and regular rhythm.      Pulses: Normal pulses.      Heart sounds: Normal heart sounds.   Pulmonary:      Effort: Pulmonary effort is normal. No respiratory distress.      Breath sounds: Normal breath sounds.   Abdominal:      General: Bowel sounds are normal. There is no distension.      Palpations: Abdomen is soft.      Tenderness: There is no abdominal tenderness.   Musculoskeletal:      Left lower leg: Edema present.   Neurological:      General: No focal deficit present.      Mental Status: He is alert and oriented to person, place, and time.   Psychiatric:         Mood and Affect: Mood normal.         Behavior: Behavior normal.      Assessment and Plan     Assessment/Plan:  Diagnoses and all orders for this visit:    1. Recurrent deep vein thrombosis (DVT) (Primary)  Assessment & Plan:  I initially sent a refill on the Xarelto to treat the DVT, but then the patient's wife called back saying that they could not afford either the Xarelto or the Eliquis due to a problem with his insurance.  We had to switch him to warfarin for DVT treatment, and he was advised to return on 8/21/2023 to repeat his INR level.  We will switch him over to Eliquis or Xarelto as soon as we can get insurance approval.    I also recommend that he " see hematology for evaluation since he has had recurrent unprovoked blood clots.  He is in agreement to go, so we will schedule him here in [place].    Orders:  -     Ambulatory Referral to Hematology / Oncology  -     Discontinue: rivaroxaban (XARELTO) 15 MG tablet; Take 1 tablet by mouth 2 (Two) Times a Day With Meals for 21 days.  Dispense: 42 tablet; Refill: 0  -     Discontinue: Xarelto 20 MG tablet; Take 1 tablet by mouth Daily With Dinner.  Dispense: 30 tablet; Refill: 2  -     warfarin (Coumadin) 5 MG tablet; Take 1 tablet by mouth Every Night.  Dispense: 30 tablet; Refill: 0  -     Protime-INR; Future    2. Alcohol use  Assessment & Plan:  I strongly encouraged him to completely stop alcohol.  We will try a sleep medication, but I encouraged him to continue with meetings and naltrexone medication.    Orders:  -     Comprehensive Metabolic Panel; Future  -     Comprehensive Metabolic Panel    3. Persistent insomnia  Assessment & Plan:  We will resume Seroquel since it has helped in the past without side effects.  Hopefully this will help with discontinuing alcohol use.    Orders:  -     QUEtiapine (SEROquel) 50 MG tablet; Take 1 tablet by mouth Every Night.  Dispense: 30 tablet; Refill: 3    4. Subacute cough  Assessment & Plan:  He has had some dry cough, so I will order a chest x-ray.  We will proceed with further evaluation if needed.    Orders:  -     CBC Auto Differential; Future  -     XR Chest 2 View; Future  -     CBC Auto Differential    5. Anxiety  Assessment & Plan:  Continue current medication.  I also encouraged counseling.    Orders:  -     busPIRone (BUSPAR) 15 MG tablet; Take 1 tablet by mouth Daily.  Dispense: 30 tablet; Refill: 0    6. Mixed hyperlipidemia  Assessment & Plan:  Level will be checked on labs.  Continue current medication.    Orders:  -     atorvastatin (LIPITOR) 80 MG tablet; Take 1 tablet by mouth every night at bedtime.  Dispense: 60 tablet; Refill: 2    7. Type 2  diabetes mellitus without complication, without long-term current use of insulin  Assessment & Plan:  His A1c has been well controlled, but we will continue to monitor    Orders:  -     Lipid Panel; Future  -     Hemoglobin A1c; Future  -     Lipid Panel  -     Hemoglobin A1c    8. Thyroid disorder screen  -     Thyroid Cascade Profile; Future  -     Thyroid Cascade Profile        Follow Up  Return in about 4 weeks (around 9/13/2023) for recheck.    Amber Scott PA-C  [facility] Internal [facility]

## 2023-08-16 NOTE — TELEPHONE ENCOUNTER
Patient's wife called to ask Radha to please call her back about her  medications for blood clots.  New phone number [phone]

## 2023-08-17 LAB
ALBUMIN SERPL-MCNC: 3.5 G/DL (ref 4.1–5.1)
ALBUMIN/GLOB SERPL: 1.4 {RATIO} (ref 1.2–2.2)
ALP SERPL-CCNC: 149 IU/L (ref 44–121)
ALT SERPL-CCNC: 36 IU/L (ref 0–44)
AST SERPL-CCNC: 32 IU/L (ref 0–40)
BASOPHILS # BLD AUTO: 0.1 X10E3/UL (ref 0–0.2)
BASOPHILS NFR BLD AUTO: 1 %
BILIRUB SERPL-MCNC: 0.5 MG/DL (ref 0–1.2)
BUN SERPL-MCNC: 6 MG/DL (ref 6–24)
BUN/CREAT SERPL: 8 (ref 9–20)
CALCIUM SERPL-MCNC: 8.6 MG/DL (ref 8.7–10.2)
CHLORIDE SERPL-SCNC: 102 MMOL/L (ref 96–106)
CHOLEST SERPL-MCNC: 99 MG/DL (ref 100–199)
CO2 SERPL-SCNC: 26 MMOL/L (ref 20–29)
CREAT SERPL-MCNC: 0.72 MG/DL (ref 0.76–1.27)
EGFRCR SERPLBLD CKD-EPI 2021: 113 ML/MIN/1.73
EOSINOPHIL # BLD AUTO: 0.1 X10E3/UL (ref 0–0.4)
EOSINOPHIL NFR BLD AUTO: 1 %
ERYTHROCYTE [DISTWIDTH] IN BLOOD BY AUTOMATED COUNT: 12.6 % (ref 11.6–15.4)
GLOBULIN SER CALC-MCNC: 2.5 G/DL (ref 1.5–4.5)
GLUCOSE SERPL-MCNC: 120 MG/DL (ref 70–99)
HBA1C MFR BLD: 5.4 % (ref 4.8–5.6)
HCT VFR BLD AUTO: 46.5 % (ref 37.5–51)
HDLC SERPL-MCNC: 33 MG/DL
HGB BLD-MCNC: 16.2 G/DL (ref 13–17.7)
IMM GRANULOCYTES # BLD AUTO: 0 X10E3/UL (ref 0–0.1)
IMM GRANULOCYTES NFR BLD AUTO: 0 %
LDLC SERPL CALC-MCNC: ABNORMAL MG/DL (ref 0–99)
LYMPHOCYTES # BLD AUTO: 2.2 X10E3/UL (ref 0.7–3.1)
LYMPHOCYTES NFR BLD AUTO: 45 %
MCH RBC QN AUTO: 33.3 PG (ref 26.6–33)
MCHC RBC AUTO-ENTMCNC: 34.8 G/DL (ref 31.5–35.7)
MCV RBC AUTO: 96 FL (ref 79–97)
MONOCYTES # BLD AUTO: 0.6 X10E3/UL (ref 0.1–0.9)
MONOCYTES NFR BLD AUTO: 11 %
NEUTROPHILS # BLD AUTO: 2.1 X10E3/UL (ref 1.4–7)
NEUTROPHILS NFR BLD AUTO: 42 %
PLATELET # BLD AUTO: 253 X10E3/UL (ref 150–450)
POTASSIUM SERPL-SCNC: 4 MMOL/L (ref 3.5–5.2)
PROT SERPL-MCNC: 6 G/DL (ref 6–8.5)
RBC # BLD AUTO: 4.86 X10E6/UL (ref 4.14–5.8)
SODIUM SERPL-SCNC: 143 MMOL/L (ref 134–144)
TRIGL SERPL-MCNC: 940 MG/DL (ref 0–149)
TSH SERPL DL<=0.005 MIU/L-ACNC: 1.57 UIU/ML (ref 0.45–4.5)
VLDLC SERPL CALC-MCNC: ABNORMAL MG/DL (ref 5–40)
WBC # BLD AUTO: 5 X10E3/UL (ref 3.4–10.8)

## 2023-08-18 ENCOUNTER — TELEPHONE (OUTPATIENT)
Dept: FAMILY MEDICINE CLINIC | Facility: CLINIC | Age: 48
End: 2023-08-18

## 2023-08-18 NOTE — TELEPHONE ENCOUNTER
Caller: BRIANNE VELEZ    Relationship: Emergency Contact    Best call back number: [phone]     When is it needed: QUETIAPINE (SEROQUEL) 50MG    Additional information or concerns: PLEASE SEND TO [facility] #[id number] - [place], [place] - [address] AT [facility] [address] - [phone] [facility] [phone]     PATIENT'S WIFE CALLED IN ABOUT THE PATIENT NEEDING PRIOR AUTHORIZATION FOR THEIR SEROQUEL    PLEASE ADVISE WHEN THIS HAS BEEN FACILITATED

## 2023-08-21 PROBLEM — R05.2 SUBACUTE COUGH: Status: ACTIVE | Noted: 2023-08-21

## 2023-08-21 PROBLEM — E11.9 TYPE 2 DIABETES MELLITUS WITHOUT COMPLICATION, WITHOUT LONG-TERM CURRENT USE OF INSULIN: Status: ACTIVE | Noted: 2023-08-21

## 2023-08-21 PROBLEM — E78.2 MIXED HYPERLIPIDEMIA: Status: ACTIVE | Noted: 2023-08-21

## 2023-08-21 PROBLEM — F41.9 ANXIETY: Status: ACTIVE | Noted: 2023-08-21

## 2023-08-21 PROBLEM — I82.409 RECURRENT DEEP VEIN THROMBOSIS (DVT): Status: ACTIVE | Noted: 2023-08-21

## 2023-08-21 NOTE — ASSESSMENT & PLAN NOTE
We will resume Seroquel since it has helped in the past without side effects.  Hopefully this will help with discontinuing alcohol use.

## 2023-08-21 NOTE — ASSESSMENT & PLAN NOTE
I initially sent a refill on the Xarelto to treat the DVT, but then the patient's wife called back saying that they could not afford either the Xarelto or the Eliquis due to a problem with his insurance.  We had to switch him to warfarin for DVT treatment, and he was advised to return on 8/21/2023 to repeat his INR level.  We will switch him over to Eliquis or Xarelto as soon as we can get insurance approval.    I also recommend that he see hematology for evaluation since he has had recurrent unprovoked blood clots.  He is in agreement to go, so we will schedule him here in [place].

## 2023-08-21 NOTE — ASSESSMENT & PLAN NOTE
He has had some dry cough, so I will order a chest x-ray.  We will proceed with further evaluation if needed.

## 2023-08-21 NOTE — ASSESSMENT & PLAN NOTE
I strongly encouraged him to completely stop alcohol.  We will try a sleep medication, but I encouraged him to continue with meetings and naltrexone medication.

## 2023-08-24 ENCOUNTER — LAB (OUTPATIENT)
Dept: FAMILY MEDICINE CLINIC | Facility: CLINIC | Age: 48
End: 2023-08-24
Payer: MEDICAID

## 2023-08-24 DIAGNOSIS — I82.409 RECURRENT DEEP VEIN THROMBOSIS (DVT): ICD-10-CM

## 2023-08-24 PROCEDURE — 36415 COLL VENOUS BLD VENIPUNCTURE: CPT | Performed by: PHYSICIAN ASSISTANT

## 2023-08-25 LAB
INR PPP: 3.4 (ref 0.9–1.2)
PROTHROMBIN TIME: 33.5 SEC (ref 9.1–12)

## 2023-08-28 NOTE — TELEPHONE ENCOUNTER
Caller: Chele Khan    Relationship: Self    Best call back number: [phone]    Requested Prescriptions:   Requested Prescriptions     Pending Prescriptions Disp Refills    Aspirin Low Dose 81 MG chewable tablet      omeprazole (priLOSEC) 40 MG capsule 90 capsule 3     Sig: Take 1 capsule by mouth Daily.        Pharmacy where request should be sent: [facility] #[id number] - [place], [place] - [address] [facility] AT [facility] [address] - [phone] [facility] [phone] FX     Last office visit with prescribing clinician: 8/16/2023   Last telemedicine visit with prescribing clinician: Visit date not found   Next office visit with prescribing clinician: 9/15/2023       Does the patient have less than a 3 day supply:  [x] Yes  [] No    Would you like a call back once the refill request has been completed: [] Yes [x] No    If the office needs to give you a call back, can they leave a voicemail: [] Yes [x] No    Ernst Fabian Rep   08/28/23 09:19 EDT

## 2023-08-29 ENCOUNTER — TELEPHONE (OUTPATIENT)
Dept: FAMILY MEDICINE CLINIC | Facility: CLINIC | Age: 48
End: 2023-08-29
Payer: MEDICAID

## 2023-09-04 RX ORDER — ASPIRIN 81 MG
81 TABLET,CHEWABLE ORAL DAILY
Qty: 30 TABLET | Refills: 1 | Status: ON HOLD | OUTPATIENT
Start: 2023-09-04

## 2023-09-04 RX ORDER — OMEPRAZOLE 40 MG/1
40 CAPSULE, DELAYED RELEASE ORAL DAILY
Qty: 90 CAPSULE | Refills: 3 | Status: ON HOLD | OUTPATIENT
Start: 2023-09-04

## 2023-09-08 ENCOUNTER — HOSPITAL ENCOUNTER (EMERGENCY)
Facility: HOSPITAL | Age: 48
Discharge: STILL ADMITTED OR ADMINISTRATIVE | DRG: 897 | End: 2023-09-08
Attending: EMERGENCY MEDICINE
Payer: MEDICAID

## 2023-09-08 ENCOUNTER — HOSPITAL ENCOUNTER (INPATIENT)
Facility: HOSPITAL | Age: 48
LOS: 5 days | Discharge: HOME | DRG: 897 | End: 2023-09-13
Attending: STUDENT IN AN ORGANIZED HEALTH CARE EDUCATION/TRAINING PROGRAM | Admitting: STUDENT IN AN ORGANIZED HEALTH CARE EDUCATION/TRAINING PROGRAM
Payer: MEDICAID

## 2023-09-08 VITALS
BODY MASS INDEX: 31.83 KG/M2 | RESPIRATION RATE: 18 BRPM | TEMPERATURE: 98.3 F | HEIGHT: 72 IN | HEART RATE: 101 BPM | OXYGEN SATURATION: 96 % | DIASTOLIC BLOOD PRESSURE: 112 MMHG | SYSTOLIC BLOOD PRESSURE: 149 MMHG | WEIGHT: 235 LBS

## 2023-09-08 DIAGNOSIS — F10.930 ALCOHOL WITHDRAWAL SYNDROME WITHOUT COMPLICATION: Primary | ICD-10-CM

## 2023-09-08 PROBLEM — F10.10 ALCOHOL ABUSE: Status: ACTIVE | Noted: 2023-09-08

## 2023-09-08 LAB
ALBUMIN SERPL-MCNC: 3.6 G/DL (ref 3.5–5.2)
ALBUMIN/GLOB SERPL: 1.2 G/DL
ALP SERPL-CCNC: 158 U/L (ref 39–117)
ALT SERPL W P-5'-P-CCNC: 40 U/L (ref 1–41)
AMPHET+METHAMPHET UR QL: NEGATIVE
AMPHETAMINES UR QL: NEGATIVE
ANION GAP SERPL CALCULATED.3IONS-SCNC: 19 MMOL/L (ref 5–15)
AST SERPL-CCNC: 82 U/L (ref 1–40)
BACTERIA UR QL AUTO: NORMAL /HPF
BARBITURATES UR QL SCN: NEGATIVE
BASOPHILS # BLD AUTO: 0.05 10*3/MM3 (ref 0–0.2)
BASOPHILS NFR BLD AUTO: 0.9 % (ref 0–1.5)
BENZODIAZ UR QL SCN: POSITIVE
BILIRUB SERPL-MCNC: 1.4 MG/DL (ref 0–1.2)
BILIRUB UR QL STRIP: ABNORMAL
BUN SERPL-MCNC: 10 MG/DL (ref 6–20)
BUN/CREAT SERPL: 10 (ref 7–25)
BUPRENORPHINE SERPL-MCNC: NEGATIVE NG/ML
CALCIUM SPEC-SCNC: 8.7 MG/DL (ref 8.6–10.5)
CANNABINOIDS SERPL QL: NEGATIVE
CHLORIDE SERPL-SCNC: 105 MMOL/L (ref 98–107)
CLARITY UR: CLEAR
CO2 SERPL-SCNC: 19 MMOL/L (ref 22–29)
COCAINE UR QL: NEGATIVE
COLOR UR: ABNORMAL
CREAT SERPL-MCNC: 1 MG/DL (ref 0.76–1.27)
DEPRECATED RDW RBC AUTO: 44.2 FL (ref 37–54)
EGFRCR SERPLBLD CKD-EPI 2021: 92.8 ML/MIN/1.73
EOSINOPHIL # BLD AUTO: 0.03 10*3/MM3 (ref 0–0.4)
EOSINOPHIL NFR BLD AUTO: 0.5 % (ref 0.3–6.2)
ERYTHROCYTE [DISTWIDTH] IN BLOOD BY AUTOMATED COUNT: 12.8 % (ref 12.3–15.4)
ETHANOL BLD-MCNC: 35 MG/DL (ref 0–10)
ETHANOL UR QL: 0.04 %
FENTANYL UR-MCNC: NEGATIVE NG/ML
GLOBULIN UR ELPH-MCNC: 2.9 GM/DL
GLUCOSE SERPL-MCNC: 109 MG/DL (ref 65–99)
GLUCOSE UR STRIP-MCNC: NEGATIVE MG/DL
HCT VFR BLD AUTO: 43.1 % (ref 37.5–51)
HGB BLD-MCNC: 14.8 G/DL (ref 13–17.7)
HGB UR QL STRIP.AUTO: NEGATIVE
HYALINE CASTS UR QL AUTO: NORMAL /LPF
IMM GRANULOCYTES # BLD AUTO: 0.02 10*3/MM3 (ref 0–0.05)
IMM GRANULOCYTES NFR BLD AUTO: 0.3 % (ref 0–0.5)
INR PPP: 1.13 (ref 0.9–1.1)
KETONES UR QL STRIP: ABNORMAL
LEUKOCYTE ESTERASE UR QL STRIP.AUTO: ABNORMAL
LYMPHOCYTES # BLD AUTO: 1.89 10*3/MM3 (ref 0.7–3.1)
LYMPHOCYTES NFR BLD AUTO: 32.9 % (ref 19.6–45.3)
MAGNESIUM SERPL-MCNC: 1.6 MG/DL (ref 1.6–2.6)
MCH RBC QN AUTO: 32.7 PG (ref 26.6–33)
MCHC RBC AUTO-ENTMCNC: 34.3 G/DL (ref 31.5–35.7)
MCV RBC AUTO: 95.4 FL (ref 79–97)
METHADONE UR QL SCN: NEGATIVE
MONOCYTES # BLD AUTO: 0.56 10*3/MM3 (ref 0.1–0.9)
MONOCYTES NFR BLD AUTO: 9.7 % (ref 5–12)
NEUTROPHILS NFR BLD AUTO: 3.2 10*3/MM3 (ref 1.7–7)
NEUTROPHILS NFR BLD AUTO: 55.7 % (ref 42.7–76)
NITRITE UR QL STRIP: POSITIVE
NRBC BLD AUTO-RTO: 0 /100 WBC (ref 0–0.2)
OPIATES UR QL: NEGATIVE
OXYCODONE UR QL SCN: NEGATIVE
PCP UR QL SCN: NEGATIVE
PH UR STRIP.AUTO: 6 [PH] (ref 5–8)
PLATELET # BLD AUTO: 190 10*3/MM3 (ref 140–450)
PMV BLD AUTO: 9.8 FL (ref 6–12)
POTASSIUM SERPL-SCNC: 3.3 MMOL/L (ref 3.5–5.2)
PROPOXYPH UR QL: NEGATIVE
PROT SERPL-MCNC: 6.5 G/DL (ref 6–8.5)
PROT UR QL STRIP: ABNORMAL
PROTHROMBIN TIME: 15 SECONDS (ref 12.1–14.7)
QT INTERVAL: 344 MS
QTC INTERVAL: 478 MS
RBC # BLD AUTO: 4.52 10*6/MM3 (ref 4.14–5.8)
RBC # UR STRIP: NORMAL /HPF
REF LAB TEST METHOD: NORMAL
SODIUM SERPL-SCNC: 143 MMOL/L (ref 136–145)
SP GR UR STRIP: 1.02 (ref 1–1.03)
SQUAMOUS #/AREA URNS HPF: NORMAL /HPF
TRICYCLICS UR QL SCN: NEGATIVE
UROBILINOGEN UR QL STRIP: ABNORMAL
WBC # UR STRIP: NORMAL /HPF
WBC NRBC COR # BLD: 5.75 10*3/MM3 (ref 3.4–10.8)

## 2023-09-08 PROCEDURE — 80053 COMPREHEN METABOLIC PANEL: CPT | Performed by: PHYSICIAN ASSISTANT

## 2023-09-08 PROCEDURE — HZ2ZZZZ DETOXIFICATION SERVICES FOR SUBSTANCE ABUSE TREATMENT: ICD-10-PCS | Performed by: PSYCHIATRY & NEUROLOGY

## 2023-09-08 PROCEDURE — 36415 COLL VENOUS BLD VENIPUNCTURE: CPT

## 2023-09-08 PROCEDURE — 81001 URINALYSIS AUTO W/SCOPE: CPT | Performed by: PHYSICIAN ASSISTANT

## 2023-09-08 PROCEDURE — 93005 ELECTROCARDIOGRAM TRACING: CPT | Performed by: PHYSICIAN ASSISTANT

## 2023-09-08 PROCEDURE — 85610 PROTHROMBIN TIME: CPT | Performed by: PHYSICIAN ASSISTANT

## 2023-09-08 PROCEDURE — 83735 ASSAY OF MAGNESIUM: CPT | Performed by: PHYSICIAN ASSISTANT

## 2023-09-08 PROCEDURE — 85025 COMPLETE CBC W/AUTO DIFF WBC: CPT | Performed by: PHYSICIAN ASSISTANT

## 2023-09-08 PROCEDURE — 93010 ELECTROCARDIOGRAM REPORT: CPT | Performed by: INTERNAL MEDICINE

## 2023-09-08 PROCEDURE — 96374 THER/PROPH/DIAG INJ IV PUSH: CPT

## 2023-09-08 PROCEDURE — 99285 EMERGENCY DEPT VISIT HI MDM: CPT

## 2023-09-08 PROCEDURE — 82077 ASSAY SPEC XCP UR&BREATH IA: CPT | Performed by: PHYSICIAN ASSISTANT

## 2023-09-08 PROCEDURE — 25010000002 DIAZEPAM PER 5 MG: Performed by: EMERGENCY MEDICINE

## 2023-09-08 PROCEDURE — 80307 DRUG TEST PRSMV CHEM ANLYZR: CPT | Performed by: PHYSICIAN ASSISTANT

## 2023-09-08 RX ORDER — ONDANSETRON 4 MG/1
4 TABLET, FILM COATED ORAL EVERY 8 HOURS PRN
COMMUNITY

## 2023-09-08 RX ORDER — DIPHENOXYLATE HYDROCHLORIDE AND ATROPINE SULFATE 2.5; .025 MG/1; MG/1
1 TABLET ORAL DAILY
Status: CANCELLED | OUTPATIENT
Start: 2023-09-09

## 2023-09-08 RX ORDER — FAMOTIDINE 20 MG/1
20 TABLET, FILM COATED ORAL 2 TIMES DAILY PRN
Status: DISCONTINUED | OUTPATIENT
Start: 2023-09-08 | End: 2023-09-13 | Stop reason: HOSPADM

## 2023-09-08 RX ORDER — LOPERAMIDE HYDROCHLORIDE 2 MG/1
2 CAPSULE ORAL
Status: DISCONTINUED | OUTPATIENT
Start: 2023-09-08 | End: 2023-09-13 | Stop reason: HOSPADM

## 2023-09-08 RX ORDER — TRAZODONE HYDROCHLORIDE 50 MG/1
50 TABLET ORAL NIGHTLY PRN
Status: DISCONTINUED | OUTPATIENT
Start: 2023-09-08 | End: 2023-09-13 | Stop reason: HOSPADM

## 2023-09-08 RX ORDER — WARFARIN SODIUM 5 MG/1
2.5 TABLET ORAL EVERY OTHER DAY
COMMUNITY
End: 2023-09-13 | Stop reason: HOSPADM

## 2023-09-08 RX ORDER — POTASSIUM CHLORIDE 1.5 G/1.58G
20 POWDER, FOR SOLUTION ORAL ONCE
Status: DISCONTINUED | OUTPATIENT
Start: 2023-09-08 | End: 2023-09-08

## 2023-09-08 RX ORDER — CLONIDINE HYDROCHLORIDE 0.1 MG/1
0.1 TABLET ORAL ONCE
Status: COMPLETED | OUTPATIENT
Start: 2023-09-08 | End: 2023-09-08

## 2023-09-08 RX ORDER — NICOTINE 21 MG/24HR
1 PATCH, TRANSDERMAL 24 HOURS TRANSDERMAL ONCE
Status: DISCONTINUED | OUTPATIENT
Start: 2023-09-08 | End: 2023-09-08 | Stop reason: HOSPADM

## 2023-09-08 RX ORDER — LORAZEPAM 1 MG/1
1 TABLET ORAL
Status: COMPLETED | OUTPATIENT
Start: 2023-09-11 | End: 2023-09-11

## 2023-09-08 RX ORDER — LORAZEPAM 2 MG/1
2 TABLET ORAL EVERY 4 HOURS PRN
Status: DISPENSED | OUTPATIENT
Start: 2023-09-09 | End: 2023-09-10

## 2023-09-08 RX ORDER — POTASSIUM CHLORIDE 20 MEQ/1
40 TABLET, EXTENDED RELEASE ORAL ONCE
Status: COMPLETED | OUTPATIENT
Start: 2023-09-08 | End: 2023-09-08

## 2023-09-08 RX ORDER — WARFARIN SODIUM 5 MG/1
5 TABLET ORAL EVERY OTHER DAY
COMMUNITY
End: 2023-09-13 | Stop reason: HOSPADM

## 2023-09-08 RX ORDER — LORAZEPAM 0.5 MG/1
0.5 TABLET ORAL
Status: COMPLETED | OUTPATIENT
Start: 2023-09-12 | End: 2023-09-12

## 2023-09-08 RX ORDER — DIAZEPAM 5 MG/ML
10 INJECTION, SOLUTION INTRAMUSCULAR; INTRAVENOUS ONCE
Status: COMPLETED | OUTPATIENT
Start: 2023-09-08 | End: 2023-09-08

## 2023-09-08 RX ORDER — FOLIC ACID 1 MG/1
1 TABLET ORAL DAILY
COMMUNITY

## 2023-09-08 RX ORDER — IBUPROFEN 400 MG/1
400 TABLET ORAL EVERY 6 HOURS PRN
Status: DISCONTINUED | OUTPATIENT
Start: 2023-09-08 | End: 2023-09-11

## 2023-09-08 RX ORDER — BENZTROPINE MESYLATE 1 MG/ML
1 INJECTION INTRAMUSCULAR; INTRAVENOUS ONCE AS NEEDED
Status: DISCONTINUED | OUTPATIENT
Start: 2023-09-08 | End: 2023-09-13 | Stop reason: HOSPADM

## 2023-09-08 RX ORDER — LORATADINE 10 MG/1
10 TABLET ORAL DAILY
COMMUNITY

## 2023-09-08 RX ORDER — ECHINACEA PURPUREA EXTRACT 125 MG
2 TABLET ORAL AS NEEDED
Status: DISCONTINUED | OUTPATIENT
Start: 2023-09-08 | End: 2023-09-13 | Stop reason: HOSPADM

## 2023-09-08 RX ORDER — BUSPIRONE HYDROCHLORIDE 15 MG/1
15 TABLET ORAL 3 TIMES DAILY PRN
COMMUNITY

## 2023-09-08 RX ORDER — METRONIDAZOLE 500 MG/1
500 TABLET ORAL 3 TIMES DAILY
COMMUNITY

## 2023-09-08 RX ORDER — NICOTINE 21 MG/24HR
1 PATCH, TRANSDERMAL 24 HOURS TRANSDERMAL EVERY 24 HOURS
Status: CANCELLED | OUTPATIENT
Start: 2023-09-09

## 2023-09-08 RX ORDER — MULTIPLE VITAMINS W/ MINERALS TAB 9MG-400MCG
1 TAB ORAL DAILY
Status: DISCONTINUED | OUTPATIENT
Start: 2023-09-09 | End: 2023-09-13 | Stop reason: HOSPADM

## 2023-09-08 RX ORDER — DIPHENOXYLATE HYDROCHLORIDE AND ATROPINE SULFATE 2.5; .025 MG/1; MG/1
1 TABLET ORAL DAILY
COMMUNITY
End: 2023-09-13 | Stop reason: HOSPADM

## 2023-09-08 RX ORDER — LORAZEPAM 0.5 MG/1
0.5 TABLET ORAL EVERY 4 HOURS PRN
Status: ACTIVE | OUTPATIENT
Start: 2023-09-12 | End: 2023-09-13

## 2023-09-08 RX ORDER — BENZTROPINE MESYLATE 1 MG/1
2 TABLET ORAL ONCE AS NEEDED
Status: DISCONTINUED | OUTPATIENT
Start: 2023-09-08 | End: 2023-09-13 | Stop reason: HOSPADM

## 2023-09-08 RX ORDER — HYDROXYZINE 50 MG/1
50 TABLET, FILM COATED ORAL EVERY 6 HOURS PRN
Status: DISCONTINUED | OUTPATIENT
Start: 2023-09-08 | End: 2023-09-13 | Stop reason: HOSPADM

## 2023-09-08 RX ORDER — DIAZEPAM 5 MG/1
10 TABLET ORAL EVERY 6 HOURS PRN
Status: DISCONTINUED | OUTPATIENT
Start: 2023-09-08 | End: 2023-09-08 | Stop reason: HOSPADM

## 2023-09-08 RX ORDER — WARFARIN SODIUM 5 MG/1
5 TABLET ORAL
Status: COMPLETED | OUTPATIENT
Start: 2023-09-09 | End: 2023-09-09

## 2023-09-08 RX ORDER — CIPROFLOXACIN 500 MG/1
500 TABLET, FILM COATED ORAL 2 TIMES DAILY
COMMUNITY

## 2023-09-08 RX ORDER — ONDANSETRON 4 MG/1
4 TABLET, FILM COATED ORAL EVERY 6 HOURS PRN
Status: DISCONTINUED | OUTPATIENT
Start: 2023-09-08 | End: 2023-09-13 | Stop reason: HOSPADM

## 2023-09-08 RX ORDER — LORAZEPAM 2 MG/1
2 TABLET ORAL
Status: DISPENSED | OUTPATIENT
Start: 2023-09-08 | End: 2023-09-09

## 2023-09-08 RX ORDER — METOPROLOL SUCCINATE 25 MG/1
25 TABLET, EXTENDED RELEASE ORAL ONCE
Status: COMPLETED | OUTPATIENT
Start: 2023-09-08 | End: 2023-09-08

## 2023-09-08 RX ORDER — LORAZEPAM 1 MG/1
1 TABLET ORAL EVERY 4 HOURS PRN
Status: ACTIVE | OUTPATIENT
Start: 2023-09-11 | End: 2023-09-12

## 2023-09-08 RX ORDER — NICOTINE 21 MG/24HR
1 PATCH, TRANSDERMAL 24 HOURS TRANSDERMAL EVERY 24 HOURS
COMMUNITY

## 2023-09-08 RX ORDER — LORAZEPAM 2 MG/1
2 TABLET ORAL
Status: COMPLETED | OUTPATIENT
Start: 2023-09-09 | End: 2023-09-09

## 2023-09-08 RX ORDER — ALUMINA, MAGNESIA, AND SIMETHICONE 2400; 2400; 240 MG/30ML; MG/30ML; MG/30ML
15 SUSPENSION ORAL EVERY 6 HOURS PRN
Status: DISCONTINUED | OUTPATIENT
Start: 2023-09-08 | End: 2023-09-13 | Stop reason: HOSPADM

## 2023-09-08 RX ORDER — WARFARIN SODIUM 2.5 MG/1
2.5 TABLET ORAL EVERY OTHER DAY
Status: CANCELLED | OUTPATIENT
Start: 2023-09-09

## 2023-09-08 RX ORDER — BENZONATATE 100 MG/1
100 CAPSULE ORAL 3 TIMES DAILY PRN
Status: DISCONTINUED | OUTPATIENT
Start: 2023-09-08 | End: 2023-09-13 | Stop reason: HOSPADM

## 2023-09-08 RX ORDER — NICOTINE 21 MG/24HR
1 PATCH, TRANSDERMAL 24 HOURS TRANSDERMAL
Status: DISCONTINUED | OUTPATIENT
Start: 2023-09-09 | End: 2023-09-13 | Stop reason: HOSPADM

## 2023-09-08 RX ORDER — WARFARIN SODIUM 5 MG/1
5 TABLET ORAL EVERY OTHER DAY
Status: CANCELLED | OUTPATIENT
Start: 2023-09-09

## 2023-09-08 RX ORDER — MULTIVITAMIN WITH IRON
2 TABLET ORAL DAILY
Status: DISCONTINUED | OUTPATIENT
Start: 2023-09-09 | End: 2023-09-13 | Stop reason: HOSPADM

## 2023-09-08 RX ORDER — ONDANSETRON 4 MG/1
4 TABLET, FILM COATED ORAL EVERY 8 HOURS PRN
Status: CANCELLED | OUTPATIENT
Start: 2023-09-08

## 2023-09-08 RX ADMIN — Medication 1 PATCH: at 16:26

## 2023-09-08 RX ADMIN — METOPROLOL SUCCINATE 25 MG: 25 TABLET, EXTENDED RELEASE ORAL at 15:45

## 2023-09-08 RX ADMIN — POTASSIUM CHLORIDE 40 MEQ: 1500 TABLET, EXTENDED RELEASE ORAL at 23:19

## 2023-09-08 RX ADMIN — DIAZEPAM 10 MG: 5 INJECTION, SOLUTION INTRAMUSCULAR; INTRAVENOUS at 16:24

## 2023-09-08 RX ADMIN — CLONIDINE HYDROCHLORIDE 0.1 MG: 0.1 TABLET ORAL at 22:48

## 2023-09-08 RX ADMIN — CLONIDINE HYDROCHLORIDE 0.1 MG: 0.1 TABLET ORAL at 18:11

## 2023-09-08 RX ADMIN — DIAZEPAM 10 MG: 5 TABLET ORAL at 15:45

## 2023-09-08 NOTE — NURSING NOTE
Pt to intake and is very shaky. Reports he feels like dts. Called ER and asked ER provider to contact me.

## 2023-09-08 NOTE — ED PROVIDER NOTES
Subjective   History of Present Illness  48-year-old male presents secondary to need for alcohol detox.  Patient states he drinks about 8x16 ounce mikes hard lemonade's per day.  He is last consumption was last evening and he had fireball.  Patient denies any suicidal homicidal ideation.  He denies any hallucinations.  Patient has had DTs in the past.  He states that he did not take his metoprolol this morning.  He has a past medical history of coronary artery disease, hypertension, diabetes.  He voices no medical complaints this time.  He presents by private vehicle.  Presents both tachycardic and hypertensive.  He is tremulous and appears to be in alcohol withdrawal.    Review of Systems   Constitutional: Negative.  Negative for fever.   HENT: Negative.     Respiratory: Negative.     Cardiovascular: Negative.  Negative for chest pain.   Gastrointestinal: Negative.  Negative for abdominal pain.   Endocrine: Negative.    Genitourinary: Negative.  Negative for dysuria.   Skin: Negative.    Neurological: Negative.    Psychiatric/Behavioral: Negative.  Negative for suicidal ideas.    All other systems reviewed and are negative.    Past Medical History:   Diagnosis Date    Alcohol abuse     Anxiety     Depression     Diabetes mellitus     Hypertension        No Known Allergies    Past Surgical History:   Procedure Laterality Date    BARIATRIC SURGERY      CARDIAC CATHETERIZATION      CORONARY STENT PLACEMENT         History reviewed. No pertinent family history.    Social History     Socioeconomic History    Marital status:    Tobacco Use    Smoking status: Every Day     Packs/day: 2.00     Years: 15.00     Pack years: 30.00     Types: Cigarettes     Start date: 1985    Smokeless tobacco: Never   Vaping Use    Vaping Use: Never used   Substance and Sexual Activity    Alcohol use: Yes     Alcohol/week: 145.0 standard drinks     Types: 145 Cans of beer per week     Comment: John's Hard, Fireball    Drug use: Never     Sexual activity: Not Currently     Partners: Female           Objective   Physical Exam  Vitals and nursing note reviewed.   Constitutional:       General: He is not in acute distress.     Appearance: He is well-developed. He is not diaphoretic.   HENT:      Head: Normocephalic and atraumatic.      Right Ear: External ear normal.      Left Ear: External ear normal.      Nose: Nose normal.   Eyes:      Conjunctiva/sclera: Conjunctivae normal.      Pupils: Pupils are equal, round, and reactive to light.   Neck:      Vascular: No JVD.      Trachea: No tracheal deviation.   Cardiovascular:      Rate and Rhythm: Normal rate and regular rhythm.      Heart sounds: Normal heart sounds. No murmur heard.  Pulmonary:      Effort: Pulmonary effort is normal. No respiratory distress.      Breath sounds: Normal breath sounds. No wheezing.   Abdominal:      General: Bowel sounds are normal.      Palpations: Abdomen is soft.      Tenderness: There is no abdominal tenderness.   Musculoskeletal:         General: No deformity. Normal range of motion.      Cervical back: Normal range of motion and neck supple.   Skin:     General: Skin is warm and dry.      Coloration: Skin is not pale.      Findings: No erythema or rash.   Neurological:      Mental Status: He is alert and oriented to person, place, and time.      Cranial Nerves: No cranial nerve deficit.   Psychiatric:         Behavior: Behavior normal.         Thought Content: Thought content normal. Thought content does not include homicidal or suicidal ideation.       Procedures  Results for orders placed or performed during the hospital encounter of 09/08/23   Comprehensive Metabolic Panel    Specimen: Blood   Result Value Ref Range    Glucose 109 (H) 65 - 99 mg/dL    BUN 10 6 - 20 mg/dL    Creatinine 1.00 0.76 - 1.27 mg/dL    Sodium 143 136 - 145 mmol/L    Potassium 3.3 (L) 3.5 - 5.2 mmol/L    Chloride 105 98 - 107 mmol/L    CO2 19.0 (L) 22.0 - 29.0 mmol/L    Calcium 8.7 8.6 -  10.5 mg/dL    Total Protein 6.5 6.0 - 8.5 g/dL    Albumin 3.6 3.5 - 5.2 g/dL    ALT (SGPT) 40 1 - 41 U/L    AST (SGOT) 82 (H) 1 - 40 U/L    Alkaline Phosphatase 158 (H) 39 - 117 U/L    Total Bilirubin 1.4 (H) 0.0 - 1.2 mg/dL    Globulin 2.9 gm/dL    A/G Ratio 1.2 g/dL    BUN/Creatinine Ratio 10.0 7.0 - 25.0    Anion Gap 19.0 (H) 5.0 - 15.0 mmol/L    eGFR 92.8 >60.0 mL/min/1.73   Urinalysis With Microscopic If Indicated (No Culture) - Urine, Clean Catch    Specimen: Urine, Clean Catch   Result Value Ref Range    Color, UA Orange (A) Yellow, Straw    Appearance, UA Clear Clear    pH, UA 6.0 5.0 - 8.0    Specific Gravity, UA 1.025 1.005 - 1.030    Glucose, UA Negative Negative    Ketones, UA Trace (A) Negative    Bilirubin, UA Small (1+) (A) Negative    Blood, UA Negative Negative    Protein, UA Trace (A) Negative    Leuk Esterase, UA Trace (A) Negative    Nitrite, UA Positive (A) Negative    Urobilinogen, UA 1.0 E.U./dL 0.2 - 1.0 E.U./dL   Ethanol    Specimen: Blood   Result Value Ref Range    Ethanol 35 (H) 0 - 10 mg/dL    Ethanol % 0.035 %   Urine Drug Screen - Urine, Clean Catch    Specimen: Urine, Clean Catch   Result Value Ref Range    THC, Screen, Urine Negative Negative    Phencyclidine (PCP), Urine Negative Negative    Cocaine Screen, Urine Negative Negative    Methamphetamine, Ur Negative Negative    Opiate Screen Negative Negative    Amphetamine Screen, Urine Negative Negative    Benzodiazepine Screen, Urine Positive (A) Negative    Tricyclic Antidepressants Screen Negative Negative    Methadone Screen, Urine Negative Negative    Barbiturates Screen, Urine Negative Negative    Oxycodone Screen, Urine Negative Negative    Propoxyphene Screen Negative Negative    Buprenorphine, Screen, Urine Negative Negative   Magnesium    Specimen: Blood   Result Value Ref Range    Magnesium 1.6 1.6 - 2.6 mg/dL   Protime-INR    Specimen: Blood   Result Value Ref Range    Protime 15.0 (H) 12.1 - 14.7 Seconds    INR 1.13 (H)  0.90 - 1.10   CBC Auto Differential    Specimen: Blood   Result Value Ref Range    WBC 5.75 3.40 - 10.80 10*3/mm3    RBC 4.52 4.14 - 5.80 10*6/mm3    Hemoglobin 14.8 13.0 - 17.7 g/dL    Hematocrit 43.1 37.5 - 51.0 %    MCV 95.4 79.0 - 97.0 fL    MCH 32.7 26.6 - 33.0 pg    MCHC 34.3 31.5 - 35.7 g/dL    RDW 12.8 12.3 - 15.4 %    RDW-SD 44.2 37.0 - 54.0 fl    MPV 9.8 6.0 - 12.0 fL    Platelets 190 140 - 450 10*3/mm3    Neutrophil % 55.7 42.7 - 76.0 %    Lymphocyte % 32.9 19.6 - 45.3 %    Monocyte % 9.7 5.0 - 12.0 %    Eosinophil % 0.5 0.3 - 6.2 %    Basophil % 0.9 0.0 - 1.5 %    Immature Grans % 0.3 0.0 - 0.5 %    Neutrophils, Absolute 3.20 1.70 - 7.00 10*3/mm3    Lymphocytes, Absolute 1.89 0.70 - 3.10 10*3/mm3    Monocytes, Absolute 0.56 0.10 - 0.90 10*3/mm3    Eosinophils, Absolute 0.03 0.00 - 0.40 10*3/mm3    Basophils, Absolute 0.05 0.00 - 0.20 10*3/mm3    Immature Grans, Absolute 0.02 0.00 - 0.05 10*3/mm3    nRBC 0.0 0.0 - 0.2 /100 WBC   Fentanyl, Urine - Urine, Clean Catch    Specimen: Urine, Clean Catch   Result Value Ref Range    Fentanyl, Urine Negative Negative   Urinalysis, Microscopic Only - Urine, Clean Catch    Specimen: Urine, Clean Catch   Result Value Ref Range    RBC, UA 0-2 None Seen, 0-2 /HPF    WBC, UA 0-2 None Seen, 0-2 /HPF    Bacteria, UA None Seen None Seen /HPF    Squamous Epithelial Cells, UA 0-2 None Seen, 0-2 /HPF    Hyaline Casts, UA None Seen None Seen /LPF    Methodology Automated Microscopy    ECG 12 Lead Tachycardia   Result Value Ref Range    QT Interval 344 ms    QTC Interval 478 ms              ED Course  ED Course as of 09/08/23 2235   Fri Sep 08, 2023   1630 EKG notes sinus tachycardia.  116 bpm.  No acute ST elevation.  QTc 478.  Electronically signed by Justin Hurst DO, 09/08/23, 4:30 PM EDT.] [SF]   2233 I assumed patient's care from [place] at the end of his shift.  Please see his documentation above.  Remainder of patient's emergency department stay has been uneventful.   Patient has been evaluated by psychiatry intake.  He is being admitted to the detox unit/discharged to behavioral health. [CM]      ED Course User Index  [CM] Real Guzman MD  [SF] Justin Hurst DO           Results for orders placed or performed during the hospital encounter of 09/08/23   Comprehensive Metabolic Panel    Specimen: Blood   Result Value Ref Range    Glucose 109 (H) 65 - 99 mg/dL    BUN 10 6 - 20 mg/dL    Creatinine 1.00 0.76 - 1.27 mg/dL    Sodium 143 136 - 145 mmol/L    Potassium 3.3 (L) 3.5 - 5.2 mmol/L    Chloride 105 98 - 107 mmol/L    CO2 19.0 (L) 22.0 - 29.0 mmol/L    Calcium 8.7 8.6 - 10.5 mg/dL    Total Protein 6.5 6.0 - 8.5 g/dL    Albumin 3.6 3.5 - 5.2 g/dL    ALT (SGPT) 40 1 - 41 U/L    AST (SGOT) 82 (H) 1 - 40 U/L    Alkaline Phosphatase 158 (H) 39 - 117 U/L    Total Bilirubin 1.4 (H) 0.0 - 1.2 mg/dL    Globulin 2.9 gm/dL    A/G Ratio 1.2 g/dL    BUN/Creatinine Ratio 10.0 7.0 - 25.0    Anion Gap 19.0 (H) 5.0 - 15.0 mmol/L    eGFR 92.8 >60.0 mL/min/1.73   Urinalysis With Microscopic If Indicated (No Culture) - Urine, Clean Catch    Specimen: Urine, Clean Catch   Result Value Ref Range    Color, UA Orange (A) Yellow, Straw    Appearance, UA Clear Clear    pH, UA 6.0 5.0 - 8.0    Specific Gravity, UA 1.025 1.005 - 1.030    Glucose, UA Negative Negative    Ketones, UA Trace (A) Negative    Bilirubin, UA Small (1+) (A) Negative    Blood, UA Negative Negative    Protein, UA Trace (A) Negative    Leuk Esterase, UA Trace (A) Negative    Nitrite, UA Positive (A) Negative    Urobilinogen, UA 1.0 E.U./dL 0.2 - 1.0 E.U./dL   Ethanol    Specimen: Blood   Result Value Ref Range    Ethanol 35 (H) 0 - 10 mg/dL    Ethanol % 0.035 %   Urine Drug Screen - Urine, Clean Catch    Specimen: Urine, Clean Catch   Result Value Ref Range    THC, Screen, Urine Negative Negative    Phencyclidine (PCP), Urine Negative Negative    Cocaine Screen, Urine Negative Negative    Methamphetamine, Ur Negative  Negative    Opiate Screen Negative Negative    Amphetamine Screen, Urine Negative Negative    Benzodiazepine Screen, Urine Positive (A) Negative    Tricyclic Antidepressants Screen Negative Negative    Methadone Screen, Urine Negative Negative    Barbiturates Screen, Urine Negative Negative    Oxycodone Screen, Urine Negative Negative    Propoxyphene Screen Negative Negative    Buprenorphine, Screen, Urine Negative Negative   Magnesium    Specimen: Blood   Result Value Ref Range    Magnesium 1.6 1.6 - 2.6 mg/dL   Protime-INR    Specimen: Blood   Result Value Ref Range    Protime 15.0 (H) 12.1 - 14.7 Seconds    INR 1.13 (H) 0.90 - 1.10   CBC Auto Differential    Specimen: Blood   Result Value Ref Range    WBC 5.75 3.40 - 10.80 10*3/mm3    RBC 4.52 4.14 - 5.80 10*6/mm3    Hemoglobin 14.8 13.0 - 17.7 g/dL    Hematocrit 43.1 37.5 - 51.0 %    MCV 95.4 79.0 - 97.0 fL    MCH 32.7 26.6 - 33.0 pg    MCHC 34.3 31.5 - 35.7 g/dL    RDW 12.8 12.3 - 15.4 %    RDW-SD 44.2 37.0 - 54.0 fl    MPV 9.8 6.0 - 12.0 fL    Platelets 190 140 - 450 10*3/mm3    Neutrophil % 55.7 42.7 - 76.0 %    Lymphocyte % 32.9 19.6 - 45.3 %    Monocyte % 9.7 5.0 - 12.0 %    Eosinophil % 0.5 0.3 - 6.2 %    Basophil % 0.9 0.0 - 1.5 %    Immature Grans % 0.3 0.0 - 0.5 %    Neutrophils, Absolute 3.20 1.70 - 7.00 10*3/mm3    Lymphocytes, Absolute 1.89 0.70 - 3.10 10*3/mm3    Monocytes, Absolute 0.56 0.10 - 0.90 10*3/mm3    Eosinophils, Absolute 0.03 0.00 - 0.40 10*3/mm3    Basophils, Absolute 0.05 0.00 - 0.20 10*3/mm3    Immature Grans, Absolute 0.02 0.00 - 0.05 10*3/mm3    nRBC 0.0 0.0 - 0.2 /100 WBC   Fentanyl, Urine - Urine, Clean Catch    Specimen: Urine, Clean Catch   Result Value Ref Range    Fentanyl, Urine Negative Negative   Urinalysis, Microscopic Only - Urine, Clean Catch    Specimen: Urine, Clean Catch   Result Value Ref Range    RBC, UA 0-2 None Seen, 0-2 /HPF    WBC, UA 0-2 None Seen, 0-2 /HPF    Bacteria, UA None Seen None Seen /HPF    Squamous  Epithelial Cells, UA 0-2 None Seen, 0-2 /HPF    Hyaline Casts, UA None Seen None Seen /LPF    Methodology Automated Microscopy    ECG 12 Lead Tachycardia   Result Value Ref Range    QT Interval 344 ms    QTC Interval 478 ms                                     Medical Decision Making  48-year-old male presents secondary to need for alcohol detox.  Patient states he drinks about 8x16 ounce mikes hard lemonade's per day.  He is last consumption was last evening and he had fireball.  Patient denies any suicidal homicidal ideation.  He denies any hallucinations.  Patient has had DTs in the past.  He states that he did not take his metoprolol this morning.  He has a past medical history of coronary artery disease, hypertension, diabetes.  He voices no medical complaints this time.  He presents by private vehicle.  Presents both tachycardic and hypertensive.  He is tremulous and appears to be in alcohol withdrawal.    Problems Addressed:  Alcohol withdrawal syndrome without complication: complicated acute illness or injury    Amount and/or Complexity of Data Reviewed  Labs: ordered. Decision-making details documented in ED Course.  ECG/medicine tests: ordered. Decision-making details documented in ED Course.  Discussion of management or test interpretation with external provider(s): On-call psychiatrist for the intake nurse.    Risk  OTC drugs.  Prescription drug management.        Final diagnoses:   Alcohol withdrawal syndrome without complication       ED Disposition  ED Disposition       ED Disposition   DC/Transfer to Behavioral Health Condition   Stable    Comment   --               Please note that portions of this note were completed with a voice recognition program.                Real Guzman MD  09/08/23 9922

## 2023-09-08 NOTE — NURSING NOTE
ER provider present and made him aware of VS. Instructed him that pt is not stable to sit in intake at this time. Instructed that they will get him a bed.

## 2023-09-09 LAB
ALBUMIN SERPL-MCNC: 3 G/DL (ref 3.5–5.2)
ALBUMIN/GLOB SERPL: 1 G/DL
ALP SERPL-CCNC: 143 U/L (ref 39–117)
ALT SERPL W P-5'-P-CCNC: 34 U/L (ref 1–41)
ANION GAP SERPL CALCULATED.3IONS-SCNC: 9.1 MMOL/L (ref 5–15)
AST SERPL-CCNC: 77 U/L (ref 1–40)
BILIRUB SERPL-MCNC: 2 MG/DL (ref 0–1.2)
BUN SERPL-MCNC: 11 MG/DL (ref 6–20)
BUN/CREAT SERPL: 11.5 (ref 7–25)
CALCIUM SPEC-SCNC: 8.4 MG/DL (ref 8.6–10.5)
CHLORIDE SERPL-SCNC: 107 MMOL/L (ref 98–107)
CO2 SERPL-SCNC: 23.9 MMOL/L (ref 22–29)
CREAT SERPL-MCNC: 0.96 MG/DL (ref 0.76–1.27)
EGFRCR SERPLBLD CKD-EPI 2021: 97.5 ML/MIN/1.73
GLOBULIN UR ELPH-MCNC: 3.1 GM/DL
GLUCOSE SERPL-MCNC: 93 MG/DL (ref 65–99)
INR PPP: 1.06 (ref 0.9–1.1)
POTASSIUM SERPL-SCNC: 4.2 MMOL/L (ref 3.5–5.2)
PROT SERPL-MCNC: 6.1 G/DL (ref 6–8.5)
PROTHROMBIN TIME: 14.3 SECONDS (ref 12.1–14.7)
SODIUM SERPL-SCNC: 140 MMOL/L (ref 136–145)

## 2023-09-09 PROCEDURE — 80053 COMPREHEN METABOLIC PANEL: CPT | Performed by: STUDENT IN AN ORGANIZED HEALTH CARE EDUCATION/TRAINING PROGRAM

## 2023-09-09 PROCEDURE — 85610 PROTHROMBIN TIME: CPT | Performed by: STUDENT IN AN ORGANIZED HEALTH CARE EDUCATION/TRAINING PROGRAM

## 2023-09-09 PROCEDURE — 99223 1ST HOSP IP/OBS HIGH 75: CPT | Performed by: PSYCHIATRY & NEUROLOGY

## 2023-09-09 PROCEDURE — 63710000001 ONDANSETRON PER 8 MG: Performed by: STUDENT IN AN ORGANIZED HEALTH CARE EDUCATION/TRAINING PROGRAM

## 2023-09-09 RX ORDER — PANTOPRAZOLE SODIUM 40 MG/1
40 TABLET, DELAYED RELEASE ORAL
Status: DISCONTINUED | OUTPATIENT
Start: 2023-09-09 | End: 2023-09-13 | Stop reason: HOSPADM

## 2023-09-09 RX ORDER — CETIRIZINE HYDROCHLORIDE 10 MG/1
10 TABLET ORAL DAILY
Status: DISCONTINUED | OUTPATIENT
Start: 2023-09-09 | End: 2023-09-13 | Stop reason: HOSPADM

## 2023-09-09 RX ORDER — ASPIRIN 81 MG/1
81 TABLET, CHEWABLE ORAL DAILY
Status: DISCONTINUED | OUTPATIENT
Start: 2023-09-09 | End: 2023-09-11

## 2023-09-09 RX ORDER — WARFARIN SODIUM 3 MG/1
3 TABLET ORAL
Status: COMPLETED | OUTPATIENT
Start: 2023-09-09 | End: 2023-09-09

## 2023-09-09 RX ORDER — FAMOTIDINE 20 MG/1
40 TABLET, FILM COATED ORAL NIGHTLY
Status: DISCONTINUED | OUTPATIENT
Start: 2023-09-09 | End: 2023-09-13 | Stop reason: HOSPADM

## 2023-09-09 RX ORDER — ALLOPURINOL 100 MG/1
100 TABLET ORAL DAILY
Status: DISCONTINUED | OUTPATIENT
Start: 2023-09-09 | End: 2023-09-13 | Stop reason: HOSPADM

## 2023-09-09 RX ORDER — METRONIDAZOLE 250 MG/1
500 TABLET ORAL 3 TIMES DAILY
Status: DISCONTINUED | OUTPATIENT
Start: 2023-09-09 | End: 2023-09-13 | Stop reason: HOSPADM

## 2023-09-09 RX ORDER — FOLIC ACID 1 MG/1
1 TABLET ORAL DAILY
Status: DISCONTINUED | OUTPATIENT
Start: 2023-09-09 | End: 2023-09-13 | Stop reason: HOSPADM

## 2023-09-09 RX ORDER — ATORVASTATIN CALCIUM 40 MG/1
80 TABLET, FILM COATED ORAL NIGHTLY
Status: DISCONTINUED | OUTPATIENT
Start: 2023-09-09 | End: 2023-09-13 | Stop reason: HOSPADM

## 2023-09-09 RX ORDER — BUSPIRONE HYDROCHLORIDE 10 MG/1
15 TABLET ORAL 3 TIMES DAILY PRN
Status: DISCONTINUED | OUTPATIENT
Start: 2023-09-09 | End: 2023-09-13 | Stop reason: HOSPADM

## 2023-09-09 RX ORDER — CIPROFLOXACIN 500 MG/1
500 TABLET, FILM COATED ORAL EVERY 12 HOURS SCHEDULED
Status: DISCONTINUED | OUTPATIENT
Start: 2023-09-09 | End: 2023-09-13 | Stop reason: HOSPADM

## 2023-09-09 RX ADMIN — CIPROFLOXACIN 500 MG: 500 TABLET, FILM COATED ORAL at 00:55

## 2023-09-09 RX ADMIN — ATORVASTATIN CALCIUM 80 MG: 40 TABLET, FILM COATED ORAL at 00:55

## 2023-09-09 RX ADMIN — TRAZODONE HYDROCHLORIDE 50 MG: 50 TABLET ORAL at 21:38

## 2023-09-09 RX ADMIN — METOPROLOL TARTRATE 25 MG: 25 TABLET, FILM COATED ORAL at 00:30

## 2023-09-09 RX ADMIN — FAMOTIDINE 40 MG: 20 TABLET, FILM COATED ORAL at 00:31

## 2023-09-09 RX ADMIN — PANTOPRAZOLE SODIUM 40 MG: 40 TABLET, DELAYED RELEASE ORAL at 08:20

## 2023-09-09 RX ADMIN — Medication 1 MG: at 08:19

## 2023-09-09 RX ADMIN — METRONIDAZOLE 500 MG: 250 TABLET ORAL at 21:38

## 2023-09-09 RX ADMIN — BENZONATATE 100 MG: 100 CAPSULE ORAL at 01:17

## 2023-09-09 RX ADMIN — Medication 100 MG: at 08:20

## 2023-09-09 RX ADMIN — WARFARIN 5 MG: 5 TABLET ORAL at 00:55

## 2023-09-09 RX ADMIN — WARFARIN 3 MG: 3 TABLET ORAL at 16:00

## 2023-09-09 RX ADMIN — METOPROLOL TARTRATE 25 MG: 25 TABLET, FILM COATED ORAL at 08:21

## 2023-09-09 RX ADMIN — LOPERAMIDE HYDROCHLORIDE 2 MG: 2 CAPSULE ORAL at 21:38

## 2023-09-09 RX ADMIN — TRAZODONE HYDROCHLORIDE 50 MG: 50 TABLET ORAL at 00:29

## 2023-09-09 RX ADMIN — METOPROLOL TARTRATE 25 MG: 25 TABLET, FILM COATED ORAL at 21:38

## 2023-09-09 RX ADMIN — METRONIDAZOLE 500 MG: 250 TABLET ORAL at 08:19

## 2023-09-09 RX ADMIN — Medication 1 TABLET: at 08:20

## 2023-09-09 RX ADMIN — Medication 2 TABLET: at 08:20

## 2023-09-09 RX ADMIN — METFORMIN HYDROCHLORIDE 1000 MG: 500 TABLET ORAL at 16:00

## 2023-09-09 RX ADMIN — LORAZEPAM 2 MG: 2 TABLET ORAL at 08:20

## 2023-09-09 RX ADMIN — HYDROXYZINE HYDROCHLORIDE 50 MG: 50 TABLET ORAL at 00:29

## 2023-09-09 RX ADMIN — CIPROFLOXACIN 500 MG: 500 TABLET, FILM COATED ORAL at 08:19

## 2023-09-09 RX ADMIN — ONDANSETRON HYDROCHLORIDE 4 MG: 4 TABLET, FILM COATED ORAL at 00:29

## 2023-09-09 RX ADMIN — METRONIDAZOLE 500 MG: 250 TABLET ORAL at 15:27

## 2023-09-09 RX ADMIN — ATORVASTATIN CALCIUM 80 MG: 40 TABLET, FILM COATED ORAL at 21:38

## 2023-09-09 RX ADMIN — LORAZEPAM 2 MG: 2 TABLET ORAL at 21:38

## 2023-09-09 RX ADMIN — LORAZEPAM 2 MG: 2 TABLET ORAL at 14:25

## 2023-09-09 RX ADMIN — ALLOPURINOL 100 MG: 100 TABLET ORAL at 08:19

## 2023-09-09 RX ADMIN — METFORMIN HYDROCHLORIDE 1000 MG: 500 TABLET ORAL at 08:19

## 2023-09-09 RX ADMIN — FAMOTIDINE 40 MG: 20 TABLET, FILM COATED ORAL at 21:39

## 2023-09-09 RX ADMIN — CETIRIZINE HYDROCHLORIDE 10 MG: 10 TABLET, FILM COATED ORAL at 09:10

## 2023-09-09 RX ADMIN — ASPIRIN 81 MG: 81 TABLET, CHEWABLE ORAL at 08:19

## 2023-09-09 RX ADMIN — CIPROFLOXACIN 500 MG: 500 TABLET, FILM COATED ORAL at 21:38

## 2023-09-09 RX ADMIN — LORAZEPAM 2 MG: 2 TABLET ORAL at 00:29

## 2023-09-09 NOTE — NURSING NOTE
Patient pockets emptied. Search completed with two staff members present. Items logged and placed in cabinet in intake area. Patient placed in safe room for evaluation. Will continue to monitor patient status. Waiting for ED provider to clear patient medically.

## 2023-09-09 NOTE — NURSING NOTE
Patient presents to intake with history of alcohol abuse. Patient admits to drinking 6 tall boys, 2 pints of Fireball, or John's Hard (6) on a daily basis. Patient admits He has attended rehab twice longest length of stay 30 days. Patient also was admitted to [facility] for 72 hours in 2019. Patient having symptoms; Anxiety; Vomiting; Sweats/diaphoretic; Stomach cramps; Craving; Leg cramps; Body aches; Nausea; Diarrhea.  Patients anxiety 9/10 and depression 7/10 with a craving of 5/10.     CIWA 15    Labs:    Ethanol 35    Potassium 3.3 decreased  Glucose 109  Aklaline phosphatase 158 increased  AST 82 increased  Total Bilirubin 1.4 decreased  INR 1.13 increased  Protime 15.0 increased    Benzo positive    UA: ketones, nitrites, leukocytes, protein, bilirubin
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
## 2023-09-09 NOTE — PLAN OF CARE
Problem: Adult Behavioral Health Plan of Care  Goal: Plan of Care Review  Outcome: Ongoing, Progressing  Flowsheets (Taken 9/9/2023 [id number])  Progress: no change  Plan of Care Reviewed With: patient  Patient Agreement with Plan of Care: agrees  Outcome Evaluation: pt calm and cooperative   Goal Outcome Evaluation:  Plan of Care Reviewed With: patient  Patient Agreement with Plan of Care: agrees     Progress: no change  Outcome Evaluation: pt calm and cooperative

## 2023-09-09 NOTE — PLAN OF CARE
Goal Outcome Evaluation:  Plan of Care Reviewed With: patient        Progress: improving     PT new admit for pm shift. Slept well. Given Tessalon, Atarax, Ativan PAS dose, Zofran, and Trazodone prn medications.
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
## 2023-09-09 NOTE — NURSING NOTE
Spoke with Dr. Park with patient report; DETOX ativan protocol; potassium 40 mg once;consistent carb diet;[facility]

## 2023-09-09 NOTE — NURSING NOTE
Called detox gave report to Deborah     Patient presents to intake with history of alcohol abuse. Patient admits to drinking 6 tall boys, 2 pints of Fireball, or John's Hard (6) on a daily basis. Patient admits He has attended rehab twice longest length of stay 30 days. Patient also was admitted to [facility] for 72 hours in 2019. Patient having symptoms; Anxiety; Vomiting; Sweats/diaphoretic; Stomach cramps; Craving; Leg cramps; Body aches; Nausea; Diarrhea.  Patients anxiety 9/10 and depression 7/10 with a craving of 5/10.      CIWA 15     Labs:     Ethanol 35     Potassium 3.3 decreased  Glucose 109  Aklaline phosphatase 158 increased  AST 82 increased  Total Bilirubin 1.4 decreased  INR 1.13 increased  Protime 15.0 increased     Benzo positive     UA: ketones, nitrites, leukocytes, protein, bilirubin
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
## 2023-09-10 LAB
INR PPP: 1.01 (ref 0.9–1.1)
PROTHROMBIN TIME: 13.9 SECONDS (ref 12.1–14.7)

## 2023-09-10 PROCEDURE — 85610 PROTHROMBIN TIME: CPT | Performed by: STUDENT IN AN ORGANIZED HEALTH CARE EDUCATION/TRAINING PROGRAM

## 2023-09-10 PROCEDURE — 99232 SBSQ HOSP IP/OBS MODERATE 35: CPT | Performed by: PSYCHIATRY & NEUROLOGY

## 2023-09-10 RX ORDER — WARFARIN SODIUM 4 MG/1
4 TABLET ORAL
Status: COMPLETED | OUTPATIENT
Start: 2023-09-10 | End: 2023-09-10

## 2023-09-10 RX ADMIN — ALLOPURINOL 100 MG: 100 TABLET ORAL at 08:03

## 2023-09-10 RX ADMIN — Medication 1 MG: at 08:02

## 2023-09-10 RX ADMIN — TRAZODONE HYDROCHLORIDE 50 MG: 50 TABLET ORAL at 21:11

## 2023-09-10 RX ADMIN — METFORMIN HYDROCHLORIDE 1000 MG: 500 TABLET ORAL at 17:05

## 2023-09-10 RX ADMIN — METOPROLOL TARTRATE 25 MG: 25 TABLET, FILM COATED ORAL at 08:03

## 2023-09-10 RX ADMIN — ATORVASTATIN CALCIUM 80 MG: 40 TABLET, FILM COATED ORAL at 21:11

## 2023-09-10 RX ADMIN — LORAZEPAM 1.5 MG: 0.5 TABLET ORAL at 14:27

## 2023-09-10 RX ADMIN — Medication 1 TABLET: at 08:03

## 2023-09-10 RX ADMIN — WARFARIN SODIUM 4 MG: 4 TABLET ORAL at 17:06

## 2023-09-10 RX ADMIN — METRONIDAZOLE 500 MG: 250 TABLET ORAL at 15:08

## 2023-09-10 RX ADMIN — Medication 100 MG: at 08:03

## 2023-09-10 RX ADMIN — METOPROLOL TARTRATE 25 MG: 25 TABLET, FILM COATED ORAL at 21:10

## 2023-09-10 RX ADMIN — METRONIDAZOLE 500 MG: 250 TABLET ORAL at 08:03

## 2023-09-10 RX ADMIN — LORAZEPAM 1.5 MG: 0.5 TABLET ORAL at 21:10

## 2023-09-10 RX ADMIN — ASPIRIN 81 MG: 81 TABLET, CHEWABLE ORAL at 08:03

## 2023-09-10 RX ADMIN — HYDROXYZINE HYDROCHLORIDE 50 MG: 50 TABLET ORAL at 21:11

## 2023-09-10 RX ADMIN — LOPERAMIDE HYDROCHLORIDE 2 MG: 2 CAPSULE ORAL at 21:11

## 2023-09-10 RX ADMIN — CETIRIZINE HYDROCHLORIDE 10 MG: 10 TABLET, FILM COATED ORAL at 08:03

## 2023-09-10 RX ADMIN — CIPROFLOXACIN 500 MG: 500 TABLET, FILM COATED ORAL at 21:11

## 2023-09-10 RX ADMIN — Medication 2 TABLET: at 08:03

## 2023-09-10 RX ADMIN — METFORMIN HYDROCHLORIDE 1000 MG: 500 TABLET ORAL at 08:03

## 2023-09-10 RX ADMIN — LORAZEPAM 1.5 MG: 0.5 TABLET ORAL at 08:03

## 2023-09-10 RX ADMIN — HYDROXYZINE HYDROCHLORIDE 50 MG: 50 TABLET ORAL at 00:27

## 2023-09-10 RX ADMIN — LORAZEPAM 2 MG: 2 TABLET ORAL at 02:54

## 2023-09-10 RX ADMIN — METRONIDAZOLE 500 MG: 250 TABLET ORAL at 21:11

## 2023-09-10 RX ADMIN — PANTOPRAZOLE SODIUM 40 MG: 40 TABLET, DELAYED RELEASE ORAL at 08:03

## 2023-09-10 RX ADMIN — FAMOTIDINE 40 MG: 20 TABLET, FILM COATED ORAL at 21:10

## 2023-09-10 RX ADMIN — CIPROFLOXACIN 500 MG: 500 TABLET, FILM COATED ORAL at 08:03

## 2023-09-10 NOTE — PROGRESS NOTES
"   Detox Recovery Unit Progress Note   Clinician: Isai Nickerson MD  Admission Date: 9/8/2023  07:37 EDT 09/10/23    Behavioral Health Treatment Plan and Problem List: I have reviewed and approved the Behavioral Health Treatment Plan and Problem list.    Allergies  No Known Allergies    Hospital Day: 2 days      Assessment completed within view of staff    History  CC: withdrawal symptoms    Interval HPI: Patient seen and evaluated by me.  Chart reviewed. Patient is withdrawing from alcohol.   Current withdrawal symptoms include: weakness, stomach cramping. Overall he feels that symptoms are improving and detox medications are helping. He c/o difficulty sleeping due to his roommate snoring.    ROS otherwise as below.        Interval Review of Systems:   General ROS: negative for - fever.  Positive for withdrawal symptoms mentioned above.  Endocrine ROS: negative for - palpitations  Respiratory ROS: no cough, shortness of breath, or wheezing  Cardiovascular ROS: no chest pain or dyspnea on exertion  Gastrointestinal ROS: no abdominal pain,no black or bloody stools    /92 (BP Location: Right arm, Patient Position: Lying)   Pulse 97   Temp 96.4 °F (35.8 °C) (Temporal)   Resp 18   Ht 182.9 cm (72\")   Wt 105 kg (231 lb 6.4 oz)   SpO2 98%   BMI 31.38 kg/m²     Mental Status Exam  Mood: normal  Affect: mood-congruent   Thought Processes: linear, logical, and goal directed  Oriented X3:  yes  Thought Content: sensorium intact   Suicidal Thoughts: denies  Homicidal Thoughts: denies        Medical Decision Making:   Labs:     Lab Results (last 24 hours)       Procedure Component Value Units Date/Time    Protime-INR [[id number]]  (Normal) Collected: 09/10/23 0354    Specimen: Blood Updated: 09/10/23 0438     Protime 13.9 Seconds      INR 1.01    Narrative:      Suggested INR therapeutic range for stable oral anticoagulant therapy:    Low Intensity therapy:   1.5-2.0  Moderate Intensity therapy:   2.0-3.0  High " Intensity therapy:   2.5-4.0              Radiology:     Imaging Results (Last 24 Hours)       ** No results found for the last 24 hours. **              EKG:     ECG/EMG Results (most recent)       None             Medications:  allopurinol, 100 mg, Oral, Daily  aspirin, 81 mg, Oral, Daily  atorvastatin, 80 mg, Oral, Nightly  B-complex with vitamin C, 2 tablet, Oral, Daily  cetirizine, 10 mg, Oral, Daily  ciprofloxacin, 500 mg, Oral, Q12H  famotidine, 40 mg, Oral, Nightly  folic acid, 1 mg, Oral, Daily  LORazepam, 1.5 mg, Oral, 3 times per day   Followed by  [START ON 9/11/2023] LORazepam, 1 mg, Oral, 3 times per day   Followed by  [START ON 9/12/2023] LORazepam, 0.5 mg, Oral, 3 times per day  metFORMIN, 1,000 mg, Oral, BID With Meals  metoprolol tartrate, 25 mg, Oral, BID  metroNIDAZOLE, 500 mg, Oral, TID  multivitamin with minerals, 1 tablet, Oral, Daily  nicotine, 1 patch, Transdermal, Q24H  pantoprazole, 40 mg, Oral, Q AM  thiamine, 100 mg, Oral, Daily  warfarin, 4 mg, Oral, Once           All medications reviewed.      Assessment and Plan:    Alcohol Use Disorder, severe  Alcohol Withdrawal  - Ativan detox protocol  - Thiamine 100 mg daily     Unspecified Anxiety Disorder  - Buspirone 20mg TID     Recent GI infection  - Patient reports that he was hospitalized X1 day at [facility] in [place], [place] several weeks ago for a GI illness. Sent home on Ciprofloxacin 500mg BID and metronidazole 500mg TID.  Will continue these to complete the course.  - Patient will need followup appointment with PCP after discharge.      Type II Diabetes   - Metformin     H/o DVT  - Warfarin     Dyslipidemia  - atorvastatin    Elevated AST  - AST 77 on admission  - Likely secondary to alcohol use  - Monitor         Continue hospitalization for medical management of drug withdrawal and patient safety and stabilization.  Continue individual and group chemical dependency counseling.   Therapist and treatment team will continue to  assist patient in establishing plans for follow-up and rehabilitation that will serve as the next step in care once patient has completed the medical detox.    This note was generated by a scribe, Daryl Gibbs. The work documented in this note was completed, reviewed, and approved by the attending psychiatrist as designated Dr. Isai Nickerson electronic signature.     I, Isai Nickerson MD, personally performed the services described in this documentation as scribed by the above named individual and is both accurate and complete as of 9/10/2023.

## 2023-09-10 NOTE — PLAN OF CARE
"Goal Outcome Evaluation:  Plan of Care Reviewed With: patient  Patient Agreement with Plan of Care: agrees     Progress: no change  Outcome Evaluation: Pt rates anxiety 6/10, depression 5/10 and denies craving. Pt denies SI/HI/AVH but states he's had \"very vivid dreams. Pt calm and cooperative with staff and peers.         "

## 2023-09-10 NOTE — PLAN OF CARE
Problem: Adult Behavioral Health Plan of Care  Goal: Plan of Care Review  9/10/2023 0730 by Liza Ruiz RN  Outcome: Ongoing, Progressing  Flowsheets (Taken 9/10/2023 0730)  Progress: improving  Plan of Care Reviewed With: patient  Patient Agreement with Plan of Care: agrees  Outcome Evaluation: Pt pleasant and cooperative.Interacts well with peers.   Goal Outcome Evaluation:  Plan of Care Reviewed With: patient  Patient Agreement with Plan of Care: agrees     Progress: improving  Outcome Evaluation: Pt pleasant and cooperative.Interacts well with peers.

## 2023-09-11 LAB
INR PPP: 0.92 (ref 0.9–1.1)
PROTHROMBIN TIME: 12.9 SECONDS (ref 12.1–14.7)

## 2023-09-11 PROCEDURE — 99232 SBSQ HOSP IP/OBS MODERATE 35: CPT | Performed by: PSYCHIATRY & NEUROLOGY

## 2023-09-11 PROCEDURE — 85610 PROTHROMBIN TIME: CPT | Performed by: STUDENT IN AN ORGANIZED HEALTH CARE EDUCATION/TRAINING PROGRAM

## 2023-09-11 RX ORDER — WARFARIN SODIUM 5 MG/1
5 TABLET ORAL
Status: COMPLETED | OUTPATIENT
Start: 2023-09-11 | End: 2023-09-11

## 2023-09-11 RX ADMIN — LORAZEPAM 1 MG: 1 TABLET ORAL at 21:17

## 2023-09-11 RX ADMIN — ATORVASTATIN CALCIUM 80 MG: 40 TABLET, FILM COATED ORAL at 21:17

## 2023-09-11 RX ADMIN — PANTOPRAZOLE SODIUM 40 MG: 40 TABLET, DELAYED RELEASE ORAL at 08:08

## 2023-09-11 RX ADMIN — METRONIDAZOLE 500 MG: 250 TABLET ORAL at 08:08

## 2023-09-11 RX ADMIN — Medication 2 TABLET: at 08:08

## 2023-09-11 RX ADMIN — LORAZEPAM 1 MG: 1 TABLET ORAL at 08:08

## 2023-09-11 RX ADMIN — CIPROFLOXACIN 500 MG: 500 TABLET, FILM COATED ORAL at 21:17

## 2023-09-11 RX ADMIN — METRONIDAZOLE 500 MG: 250 TABLET ORAL at 15:00

## 2023-09-11 RX ADMIN — HYDROXYZINE HYDROCHLORIDE 50 MG: 50 TABLET ORAL at 14:34

## 2023-09-11 RX ADMIN — ASPIRIN 81 MG: 81 TABLET, CHEWABLE ORAL at 08:08

## 2023-09-11 RX ADMIN — ALLOPURINOL 100 MG: 100 TABLET ORAL at 08:08

## 2023-09-11 RX ADMIN — METRONIDAZOLE 500 MG: 250 TABLET ORAL at 21:17

## 2023-09-11 RX ADMIN — Medication 1 TABLET: at 08:08

## 2023-09-11 RX ADMIN — Medication 100 MG: at 08:08

## 2023-09-11 RX ADMIN — APIXABAN 5 MG: 5 TABLET, FILM COATED ORAL at 14:34

## 2023-09-11 RX ADMIN — HYDROXYZINE HYDROCHLORIDE 50 MG: 50 TABLET ORAL at 21:17

## 2023-09-11 RX ADMIN — CIPROFLOXACIN 500 MG: 500 TABLET, FILM COATED ORAL at 08:08

## 2023-09-11 RX ADMIN — LORAZEPAM 1.5 MG: 0.5 TABLET ORAL at 01:48

## 2023-09-11 RX ADMIN — Medication 1 MG: at 08:08

## 2023-09-11 RX ADMIN — HYDROXYZINE HYDROCHLORIDE 50 MG: 50 TABLET ORAL at 08:08

## 2023-09-11 RX ADMIN — LORAZEPAM 1 MG: 1 TABLET ORAL at 14:34

## 2023-09-11 RX ADMIN — METFORMIN HYDROCHLORIDE 1000 MG: 500 TABLET ORAL at 08:08

## 2023-09-11 RX ADMIN — METOPROLOL TARTRATE 25 MG: 25 TABLET, FILM COATED ORAL at 21:17

## 2023-09-11 RX ADMIN — TRAZODONE HYDROCHLORIDE 50 MG: 50 TABLET ORAL at 21:17

## 2023-09-11 RX ADMIN — CETIRIZINE HYDROCHLORIDE 10 MG: 10 TABLET, FILM COATED ORAL at 08:08

## 2023-09-11 RX ADMIN — METOPROLOL TARTRATE 25 MG: 25 TABLET, FILM COATED ORAL at 08:08

## 2023-09-11 RX ADMIN — METFORMIN HYDROCHLORIDE 1000 MG: 500 TABLET ORAL at 16:01

## 2023-09-11 RX ADMIN — FAMOTIDINE 40 MG: 20 TABLET, FILM COATED ORAL at 21:17

## 2023-09-11 RX ADMIN — WARFARIN 5 MG: 5 TABLET ORAL at 10:23

## 2023-09-11 RX ADMIN — APIXABAN 5 MG: 5 TABLET, FILM COATED ORAL at 21:17

## 2023-09-11 NOTE — CONSULTS
[facility] HOSPITALIST CONSULT      Reason for Consult: Hx of DVT, anticoagulation in the setting of recent hematochezia  Referring Physician: Dr. Isabelle Gomezons is a 48 y.o. male admitted to the [facility] for treatment of alcohol abuse and detox. Hospitalist service consulted for input regarding anticoagulation in the setting of hx of DVT's with recently reported hematochezia. Pt is currently on Cipro and Flagyl for reported recent GI infection diagnosed at [facility] in [place]. Pt reports an episode of hematochezia a few weeks ago but denies any other episodes. Reports hx of hemorrhoids and states he had been straining to have BM's as he was experiencing constipation during this episode. Denies any other signs of bleeding. Denies hematemesis, hemoptysis, hematuria or epistaxis. Reports hx of recurrent DVT's. Previously on Xarelto and states he believes he was placed on Coumadin for this DVT due to cost/insurance coverage. States he has not followed up with hematology for further eval of recurrent DVT's. No reported hx of PE's.     History  Past Medical History:   Diagnosis Date    Alcohol abuse     Anxiety     Tejada's esophagus     Depression     Diabetes mellitus     DVT (deep venous thrombosis)     Hypercholesteremia     Hypertension     Neuropathy      Past Surgical History:   Procedure Laterality Date    BARIATRIC SURGERY      CARDIAC CATHETERIZATION      CORONARY STENT PLACEMENT       Family History   Problem Relation Age of Onset    Depression Father     Alcohol abuse Father     Suicide Attempts Father     Diabetes Father     Hypertension Father     Heart attack Father      Social History     Tobacco Use    Smoking status: Every Day     Packs/day: 1.50     Years: 14.00     Pack years: 21.00     Types: Cigarettes     Start date: 1985    Smokeless tobacco: Never    Tobacco comments:     Began smoking at age 11, quit in 20's for 13 years, has been  smoking for the last 14 years (age33)   Vaping Use    Vaping Use: Never used   Substance Use Topics    Alcohol use: Yes     Alcohol/week: 145.0 standard drinks     Types: 145 Cans of beer per week     Comment: 6 tall boys or 6 [employer] Hard lemonade- yesterday had 2 pts of Fireball    Drug use: Never     Comment: etoh     Medications Prior to Admission   Medication Sig Dispense Refill Last Dose    allopurinol (ZYLOPRIM) 100 MG tablet Take 1 tablet by mouth Daily. 60 tablet 0 Past Week    Aspirin Low Dose 81 MG chewable tablet Chew 1 tablet Daily. 30 tablet 1 Past Week    atorvastatin (LIPITOR) 80 MG tablet Take 1 tablet by mouth every night at bedtime. 60 tablet 2 Past Week    ciprofloxacin (CIPRO) 500 MG tablet Take 1 tablet by mouth 2 (Two) Times a Day.   Past Week    famotidine (PEPCID) 40 MG tablet Take 1 tablet by mouth Every Night. 90 tablet 1 Past Week    folic acid (FOLVITE) 1 MG tablet Take 1 tablet by mouth Daily.   Past Week    loratadine (CLARITIN) 10 MG tablet Take 1 tablet by mouth Daily.   Past Week    metFORMIN (GLUCOPHAGE) 1000 MG tablet TAKE 1 TABLET BY MOUTH TWICE DAILY WITH MEALS 180 tablet 0 Past Week    metoprolol tartrate (LOPRESSOR) 25 MG tablet Take 1 tablet by mouth 2 (Two) Times a Day. 60 tablet 0 Past Week    metroNIDAZOLE (FLAGYL) 500 MG tablet Take 1 tablet by mouth 3 (Three) Times a Day.   Past Week    multivitamin (THERAGRAN) tablet tablet Take 1 tablet by mouth Daily.   Past Week    nicotine (NICODERM CQ) 21 MG/24HR patch Place 1 patch on the skin as directed by provider Daily.   9/8/2023    omeprazole (priLOSEC) 40 MG capsule Take 1 capsule by mouth Daily. 90 capsule 3 Past Week    thiamine (VITAMIN B-1) 100 MG tablet  tablet Take 1 tablet by mouth Daily.   Past Week    warfarin (COUMADIN) 5 MG tablet Take 0.5 tablets by mouth Every Other Day.   Past Week    warfarin (COUMADIN) 5 MG tablet Take 1 tablet by mouth Every Other Day.   Past Week    busPIRone (BUSPAR) 15 MG tablet Take 1  tablet by mouth 3 (Three) Times a Day As Needed (Mood).   Unknown    ondansetron (ZOFRAN) 4 MG tablet Take 1 tablet by mouth Every 8 (Eight) Hours As Needed for Nausea or Vomiting.   Unknown       Current Facility-Administered Medications:     allopurinol (ZYLOPRIM) tablet 100 mg, 100 mg, Oral, Daily, Payal Park MD, 100 mg at 09/11/23 0808    aluminum-magnesium hydroxide-simethicone (MAALOX MAX) 400-400-40 MG/5ML suspension 15 mL, 15 mL, Oral, Q6H PRN, Payal Park MD    apixaban (ELIQUIS) tablet 5 mg, 5 mg, Oral, Q12H, Lacho Walton MD, 5 mg at 09/11/23 1434    atorvastatin (LIPITOR) tablet 80 mg, 80 mg, Oral, Nightly, Payal Park MD, 80 mg at 09/10/23 2111    B-complex with vitamin C tablet 2 tablet, 2 tablet, Oral, Daily, ParkPayal MD, 2 tablet at 09/11/23 0808    benzonatate (TESSALON) capsule 100 mg, 100 mg, Oral, TID PRN, Payal Park MD, 100 mg at 09/09/23 0117    benztropine (COGENTIN) tablet 2 mg, 2 mg, Oral, Once PRN **OR** benztropine (COGENTIN) injection 1 mg, 1 mg, Intramuscular, Once PRN, Payal Park MD    busPIRone (BUSPAR) tablet 15 mg, 15 mg, Oral, TID PRN, Payal Park MD    cetirizine (zyrTEC) tablet 10 mg, 10 mg, Oral, Daily, Payal Park MD, 10 mg at 09/11/23 0808    ciprofloxacin (CIPRO) tablet 500 mg, 500 mg, Oral, Q12H, Payal Park MD, 500 mg at 09/11/23 0808    famotidine (PEPCID) tablet 20 mg, 20 mg, Oral, BID PRN, Payal Park MD    famotidine (PEPCID) tablet 40 mg, 40 mg, Oral, Nightly, Payal Park MD, 40 mg at 09/10/23 2110    folic acid (FOLVITE) tablet 1 mg, 1 mg, Oral, Daily, Payal Park MD, 1 mg at 09/11/23 0808    hydrOXYzine (ATARAX) tablet 50 mg, 50 mg, Oral, Q6H PRN, Payal Park MD, 50 mg at 09/11/23 1434    loperamide (IMODIUM) capsule 2 mg, 2 mg, Oral, Q2H PRN, Payal Park MD, 2 mg at 09/10/23 2111    [COMPLETED] LORazepam (ATIVAN) tablet 2 mg, 2 mg,  Oral, 3 times per day, 2 mg at 238 **FOLLOWED BY** [COMPLETED] LORazepam (ATIVAN) tablet 1.5 mg, 1.5 mg, Oral, 3 times per day, 1.5 mg at 09/10/23 2110 **FOLLOWED BY** LORazepam (ATIVAN) tablet 1 mg, 1 mg, Oral, 3 times per day, 1 mg at 23 1434 **FOLLOWED BY** [START ON 2023] LORazepam (ATIVAN) tablet 0.5 mg, 0.5 mg, Oral, 3 times per day, Payal Park MD    [] LORazepam (ATIVAN) tablet 2 mg, 2 mg, Oral, Q4H PRN, 2 mg at 09/10/23 0254 **FOLLOWED BY** [] LORazepam (ATIVAN) tablet 1.5 mg, 1.5 mg, Oral, Q4H PRN, 1.5 mg at 23 0148 **FOLLOWED BY** LORazepam (ATIVAN) tablet 1 mg, 1 mg, Oral, Q4H PRN **FOLLOWED BY** [START ON 2023] LORazepam (ATIVAN) tablet 0.5 mg, 0.5 mg, Oral, Q4H PRN, Payal Park MD    magnesium hydroxide (MILK OF MAGNESIA) suspension 10 mL, 10 mL, Oral, Daily PRN, Payal Park MD    metFORMIN (GLUCOPHAGE) tablet 1,000 mg, 1,000 mg, Oral, BID With Meals, Payal Park MD, 1,000 mg at 23 1601    metoprolol tartrate (LOPRESSOR) tablet 25 mg, 25 mg, Oral, BID, Payal Park MD, 25 mg at 23 0808    metroNIDAZOLE (FLAGYL) tablet 500 mg, 500 mg, Oral, TID, Payal Park MD, 500 mg at 23 1500    multivitamin with minerals 1 tablet, 1 tablet, Oral, Daily, Payal Park MD, 1 tablet at 23 0808    nicotine (NICODERM CQ) 21 MG/24HR patch 1 patch, 1 patch, Transdermal, Q24H, Payal Park MD    ondansetron (ZOFRAN) tablet 4 mg, 4 mg, Oral, Q6H PRN, Payal Park MD, 4 mg at 23 0029    pantoprazole (PROTONIX) EC tablet 40 mg, 40 mg, Oral, Q AM, Payal Park MD, 40 mg at 23 0808    Pharmacy Consult, , Does not apply, Continuous PRN, Ronnie Marroquin DO    sodium chloride nasal spray 2 spray, 2 spray, Each Nare, PRN, Payal Park MD    thiamine (VITAMIN B-1) tablet 100 mg, 100 mg, Oral, Daily, Payal Park MD, 100 mg at 23 0808     traZODone (DESYREL) tablet 50 mg, 50 mg, Oral, Nightly PRN, Payal Park MD, 50 mg at 09/10/23 2111  Allergies:  Patient has no known allergies.      Review of Systems:    Review of Systems   Constitutional:  Positive for activity change, appetite change and chills.   HENT:  Negative for nosebleeds and trouble swallowing.    Eyes:  Negative for photophobia and pain.   Respiratory:  Negative for shortness of breath and wheezing.    Cardiovascular:  Positive for leg swelling. Negative for chest pain.   Gastrointestinal:  Positive for blood in stool, diarrhea and nausea. Negative for abdominal pain.   Endocrine: Negative for polydipsia, polyphagia and polyuria.   Genitourinary:  Negative for dysuria and hematuria.   Musculoskeletal:  Negative for neck pain and neck stiffness.   Skin:  Negative for rash and wound.   Neurological:  Negative for seizures and syncope.   Psychiatric/Behavioral:  The patient is nervous/anxious.      Objective     Vital Signs  Temp:  [96.9 °F (36.1 °C)-97.6 °F (36.4 °C)] 97.6 °F (36.4 °C)  Heart Rate:  [] 85  Resp:  [18-20] 20  BP: (124-153)/() 130/90    Physical Exam:  General:    Awake, alert, in no acute distress   Head:    Normocephalic, atraumatic   Eyes:           PERRLA, EOMI. Conjunctivae and sclerae normal. No icterus or pallor.   Ears:    Ears appear intact with no abnormalities noted.   Throat:   No oral lesions or thrush. Oral mucosa moist   Heart:      Normal S1 and S2. Regular rate and rhythm. No significant murmur, rubs or gallops appreciated.   Lungs:     Respirations regular, even and unlabored. Lungs clear to auscultation B/L. No wheezes, rales or rhonchi.   Abdomen:   Soft and nontender. No guarding, rebound tenderness or  organomegaly noted. Bowel sounds present x 4.   MS:   Muscular strength intact and equal B/L. No joint swelling, deformity, or tenderness.   Extremities:  No clubbing, cyanosis or edema noted. Moves UE and LE equally B/L.   Pulses:    Pulses palpable and equal bilaterally.   Skin:   No bleeding, bruising or rash.   Lymph nodes:   No palpable adenopathy   Neurologic:   Cranial nerves 2 - 12 grossly intact. Sensation intact.        Results Review:     I reviewed the patient's new imaging results and agree with the interpretation.  I reviewed the patient's other test results and agree with the interpretation.      Results from last 7 days   Lab Units 09/08/23  1521   WBC 10*3/mm3 5.75   HEMOGLOBIN g/dL 14.8   PLATELETS 10*3/mm3 190     Results from last 7 days   Lab Units 09/09/23  0442 09/08/23  1521   SODIUM mmol/L 140 143   POTASSIUM mmol/L 4.2 3.3*   CHLORIDE mmol/L 107 105   CO2 mmol/L 23.9 19.0*   BUN mg/dL 11 10   CREATININE mg/dL 0.96 1.00   CALCIUM mg/dL 8.4* 8.7   GLUCOSE mg/dL 93 109*     Results from last 7 days   Lab Units 09/09/23  0442 09/08/23  1521   BILIRUBIN mg/dL 2.0* 1.4*   ALK PHOS U/L 143* 158*   AST (SGOT) U/L 77* 82*   ALT (SGPT) U/L 34 40     Results from last 7 days   Lab Units 09/08/23  1521   MAGNESIUM mg/dL 1.6     Results from last 7 days   Lab Units 09/11/23  0745 09/10/23  0354 09/09/23  0446 09/08/23  1521   INR  0.92 1.01 1.06 1.13*           Imaging Results (Last 24 Hours)       ** No results found for the last 24 hours. **                  Assessment & Plan   Hx of recurrent DVT's  Recent hematochezia  Alcohol abuse with withdrawal  DM II, non-insulin dependent  Dyslipidemia    Plan  Episode of hematochezia possibly related to hemorrhoids and constipation per pt's reported history during my encounter. H&H currently stable. Benefit of anticoagulation currently outweighs potential bleeding risk. Pt has been on Coumadin per pharmacy dosing with subtherapeutic INR. Primary team initiated Eliquis this afternoon. Consulted pharmacy to evaluate cost of Eliquis in the setting of previously documented concern for cost/insurance coverage. Per report, Eliquis will be covered with $0 copay. Will plan to continue Eliquis and  stop Coumadin. Discussed with patient the importance of follow up with hematology for further eval in the setting of recurrent DVT's. Will monitor closely for any signs of bleeding on Eliquis.     Thank you for the opportunity to participate in the care of your patient. Please do not hesitate to call with any questions or concerns.     I discussed the patients findings and my recommendations with patient and pharmacy .      Ronnie Marroquin DO  09/11/23  16:19 EDT

## 2023-09-11 NOTE — PROGRESS NOTES
"INPATIENT PSYCHIATRIC PROGRESS NOTE    Name:  Chele Khan  :  1975  MRN:  [id number]  Visit Number:  [id number]  Length of stay:  3    SUBJECTIVE    CC/Focus of Exam: alcohol use    INTERVAL HISTORY:  The patient reports he came to the hospital for alcohol use and states he is feeling some better today and the treatment is helping with the withdrawal symptoms.   Depression rating 4/10  Anxiety rating 3/10  Sleep:not very good  Withdrawal sx: none reported.  Craving: 3/10    Review of Systems   Constitutional: Negative.    Respiratory: Negative.     Cardiovascular: Negative.    Gastrointestinal: Negative.      OBJECTIVE    Temp:  [96.9 °F (36.1 °C)-98.1 °F (36.7 °C)] 96.9 °F (36.1 °C)  Heart Rate:  [] 89  Resp:  [18-20] 18  BP: (124-153)/() 141/91    MENTAL STATUS EXAM:  Appearance:Casually dressed, good hygeine.   Cooperation:Cooperative  Psychomotor: No psychomotor agitation/retardation, No EPS, No motor tics  Speech-normal rate, amount.  Mood \"better\"   Affect-congruent, appropriate, stable  Thought Content-goal directed, no delusional material present  Thought process-linear, organized.  Suicidality: No SI  Homicidality: No HI  Perception: No AH/VH  Insight-fair   Judgement-fair    Lab Results (last 24 hours)       Procedure Component Value Units Date/Time    Protime-INR [[id number]]  (Normal) Collected: 23    Specimen: Blood Updated: 23     Protime 12.9 Seconds      INR 0.92    Narrative:      Suggested INR therapeutic range for stable oral anticoagulant therapy:    Low Intensity therapy:   1.5-2.0  Moderate Intensity therapy:   2.0-3.0  High Intensity therapy:   2.5-4.0               Imaging Results (Last 24 Hours)       ** No results found for the last 24 hours. **               ECG/EMG Results (most recent)       None             ALLERGIES: Patient has no known allergies.    Medication Review:   Scheduled Medications:  allopurinol, 100 mg, Oral, Daily  aspirin, 81 " mg, Oral, Daily  atorvastatin, 80 mg, Oral, Nightly  B-complex with vitamin C, 2 tablet, Oral, Daily  cetirizine, 10 mg, Oral, Daily  ciprofloxacin, 500 mg, Oral, Q12H  famotidine, 40 mg, Oral, Nightly  folic acid, 1 mg, Oral, Daily  LORazepam, 1 mg, Oral, 3 times per day   Followed by  [START ON 2023] LORazepam, 0.5 mg, Oral, 3 times per day  metFORMIN, 1,000 mg, Oral, BID With Meals  metoprolol tartrate, 25 mg, Oral, BID  metroNIDAZOLE, 500 mg, Oral, TID  multivitamin with minerals, 1 tablet, Oral, Daily  nicotine, 1 patch, Transdermal, Q24H  pantoprazole, 40 mg, Oral, Q AM  thiamine, 100 mg, Oral, Daily         PRN Medications:    aluminum-magnesium hydroxide-simethicone    benzonatate    benztropine **OR** benztropine    busPIRone    famotidine    hydrOXYzine    ibuprofen    loperamide    [] LORazepam **FOLLOWED BY** [] LORazepam **FOLLOWED BY** LORazepam **FOLLOWED BY** [START ON 2023] LORazepam    magnesium hydroxide    ondansetron    Pharmacy to dose warfarin    Pharmacy to dose warfarin    sodium chloride    traZODone   All medications reviewed.    ASSESSMENT & PLAN:    Alcohol Use Disorder, severe  Alcohol Withdrawal  - Continue Ativan detox protocol  - Thiamine 100 mg daily     Unspecified Anxiety Disorder  - Buspirone 20mg TID     Recent GI infection  - Patient reports that he was hospitalized X1 day at [facility] in [place], [place] several weeks ago for a GI illness. Sent home on Ciprofloxacin 500mg BID and metronidazole 500mg TID.  Will continue these to complete the course.  - Patient will need followup appointment with PCP after discharge.      Type II Diabetes   - Metformin     H/o DVT  - Warfarin  - Pharmacy to dose     Dyslipidemia  - atorvastatin     Elevated AST  - AST 77 on admission  - Likely secondary to alcohol use  - Monitor     Special precautions: Special Precautions Level 4 (q30 min checks).    Behavioral Health Treatment Plan and Problem List: I have reviewed  and approved the Behavioral Health Treatment Plan and Problem list.  The patient has had a chance to review and agrees with the treatment plan.    Copied text in portions of this note has been reviewed and is accurate as of 09/11/23         Clinician:  Lacho Walton MD  09/11/23  12:07 EDT

## 2023-09-11 NOTE — PLAN OF CARE
Goal Outcome Evaluation:  Plan of Care Reviewed With: patient  Patient Agreement with Plan of Care: agrees        Outcome Evaluation: Patient rates Anx3 Dep 0Denies SI/HI/AVH He has not slept much at all this shift. Pt talking about wanting to AMA out encouraged him to discuss with Dr randal BENAVIDEZ

## 2023-09-11 NOTE — PROGRESS NOTES
Navigator is helping with the following referrals:    [facility] - [phone]  -Left message. 9/11  -Spoke with Adri[facility]. They will accept patient at discharge.  9/11

## 2023-09-11 NOTE — PLAN OF CARE
Goal Outcome Evaluation:  Plan of Care Reviewed With: patient  Patient Agreement with Plan of Care: agrees     Progress: improving  Outcome Evaluation: Patient has done well this shift with good appetite but reports up/down sleep last night. Patient has interacted well in group and with peers. No acute distress noted. Will continue with plan of care.

## 2023-09-11 NOTE — PLAN OF CARE
Goal Outcome Evaluation:        Problem: Adult Behavioral Health Plan of Care  Goal: Patient-Specific Goal (Individualization)  Outcome: Ongoing, Progressing  Flowsheets  Taken 9/9/2023 1538 by Genet Jeffries  Patient Personal Strengths:   family/social support   motivated for recovery   motivated for treatment  Patient-Specific Goals (Include Timeframe): Identify 2-3 coping skills, address relaspe prevention methods, complete aftercare plan, complete safety plan, and deny SI/HI prior to discharge.  Patient Vulnerabilities: substance abuse/addiction  Taken 9/9/2023 0010 by Yvonne Barnett RN  Individualized Care Needs: plans on going to [facility]  Anxieties, Fears or Concerns: apprehensive about admission  Goal: Optimized Coping Skills in Response to Life Stressors  Outcome: Ongoing, Progressing  Flowsheets (Taken 9/9/2023 1538 by Genet Jeffries)  Optimized Coping Skills in Response to Life Stressors: making progress toward outcome  Intervention: Promote Effective Coping Strategies  Flowsheets (Taken 9/11/2023 1132)  Supportive Measures:   active listening utilized   counseling provided   decision-making supported   goal-setting facilitated   verbalization of feelings encouraged   self-responsibility promoted   self-reflection promoted   self-care encouraged   positive reinforcement provided  Goal: Develops/Participates in Therapeutic [place] to Support Successful Transition  Outcome: Ongoing, Progressing  Flowsheets (Taken 9/9/2023 1538 by Genet Jeffries)  Develops/Participates in Therapeutic [place] to Support Successful Transition: making progress toward outcome  Intervention: Foster Therapeutic [place]  Flowsheets (Taken 9/11/2023 0707 by Josette Linda RN)  Trust Relationship/Rapport:   care explained   choices provided   emotional support provided   empathic listening provided   questions answered   questions encouraged   reassurance provided   thoughts/feelings  acknowledged  Intervention: Mutually Develop Transition Plan  Flowsheets  Taken 9/11/2023 1132 by Nelli Mckenna CSW  Outpatient/Agency/Support Group Needs: residential services  Transition Support:   follow-up care discussed   follow-up care coordinated   community resources reviewed   crisis management plan promoted   crisis management plan verbalized  Taken 9/11/2023 1130 by Nelli Mckenna CSW  Discharge Coordination/Progress: Patient has [facility] Medicaid. Patient plans to go to [facility] when stable.  Transportation Anticipated: agency  Transportation Concerns: none  Current Discharge Risk: substance use/abuse  Concerns to be Addressed:   substance/tobacco abuse/use   cognitive/perceptual   coping/stress   mental health   discharge planning  Readmission Within the Last 30 Days: no previous admission in last 30 days  Patient/Family Anticipated Services at Transition: rehabilitation services  Patient's Choice of Community Agency(s): [facility]  Patient/Family Anticipates Transition to: inpatient rehabilitation facility  Offered/Gave Vendor List: no  Taken 9/9/2023 1538 by Genet Jeffries  Anticipated Discharge Disposition: residential substance use unit         DATA:  Therapist met with Patient individually this date. Patient agreeable to discuss current treatment progress and discharge concerns.     CLINICAL MANUVERING/INTERVENTIONS:  Assisted Patient in processing session content; acknowledged and normalized Patient’s thoughts, feelings, and concerns by utilizing a person-centered approach in efforts to build appropriate rapport and a positive therapeutic relationship with open and honest communication. Allowed Patient to ventilate regarding current stressors and triggers for negative emotions and thoughts in a safe nonjudgmental environment with unconditional positive regard, active listening skills, and empathy. Therapist implemented motivational interviewing techniques to assist Patient with  exploring personal growth and change and discussed distress tolerance skills, self soothing techniques, and applied cognitive behavioral strategies to facilitate identification of maladaptive patterns of thinking and behavior.Therapist utilized dialectical behavior techniques to teach and model emotional regulation and relaxation methods. Therapist assisted Patient with identifying and implementing healthier coping strategies. Therapist assisted Patient with safety planning; Patient agreed to continue honest communication with Treatment Team while inpatient and identify any SI/HI.  Patient encouraged to seek nearest ER or contact 911 if danger to self or others post discharge.     ASSESSMENT:    Therapist met with patient on this date, patient continues to receive treatment for alcohol detox. Patient reports mild anxiety and depression, denies current SI/HIAVH. Patient denies experiencing any active withdrawal symptoms. Patient plans to return to [facility] when stable, pending approval. [facility] will provide transportation.     PLAN:   Patient will continue stabilization. Patient will continue to receive services offered by Treatment Team.     Admit to [facility] at discharge.

## 2023-09-12 LAB
BASOPHILS # BLD AUTO: 0.06 10*3/MM3 (ref 0–0.2)
BASOPHILS NFR BLD AUTO: 0.8 % (ref 0–1.5)
DEPRECATED RDW RBC AUTO: 45.9 FL (ref 37–54)
EOSINOPHIL # BLD AUTO: 0.22 10*3/MM3 (ref 0–0.4)
EOSINOPHIL NFR BLD AUTO: 2.9 % (ref 0.3–6.2)
ERYTHROCYTE [DISTWIDTH] IN BLOOD BY AUTOMATED COUNT: 13.1 % (ref 12.3–15.4)
HCT VFR BLD AUTO: 38.9 % (ref 37.5–51)
HGB BLD-MCNC: 13.1 G/DL (ref 13–17.7)
IMM GRANULOCYTES # BLD AUTO: 0.03 10*3/MM3 (ref 0–0.05)
IMM GRANULOCYTES NFR BLD AUTO: 0.4 % (ref 0–0.5)
LYMPHOCYTES # BLD AUTO: 1.81 10*3/MM3 (ref 0.7–3.1)
LYMPHOCYTES NFR BLD AUTO: 23.7 % (ref 19.6–45.3)
MCH RBC QN AUTO: 33.2 PG (ref 26.6–33)
MCHC RBC AUTO-ENTMCNC: 33.7 G/DL (ref 31.5–35.7)
MCV RBC AUTO: 98.5 FL (ref 79–97)
MONOCYTES # BLD AUTO: 0.55 10*3/MM3 (ref 0.1–0.9)
MONOCYTES NFR BLD AUTO: 7.2 % (ref 5–12)
NEUTROPHILS NFR BLD AUTO: 4.97 10*3/MM3 (ref 1.7–7)
NEUTROPHILS NFR BLD AUTO: 65 % (ref 42.7–76)
NRBC BLD AUTO-RTO: 0 /100 WBC (ref 0–0.2)
PLATELET # BLD AUTO: 166 10*3/MM3 (ref 140–450)
PMV BLD AUTO: 10.7 FL (ref 6–12)
RBC # BLD AUTO: 3.95 10*6/MM3 (ref 4.14–5.8)
WBC NRBC COR # BLD: 7.64 10*3/MM3 (ref 3.4–10.8)

## 2023-09-12 PROCEDURE — 99232 SBSQ HOSP IP/OBS MODERATE 35: CPT | Performed by: PSYCHIATRY & NEUROLOGY

## 2023-09-12 PROCEDURE — 85025 COMPLETE CBC W/AUTO DIFF WBC: CPT | Performed by: INTERNAL MEDICINE

## 2023-09-12 RX ADMIN — ATORVASTATIN CALCIUM 80 MG: 40 TABLET, FILM COATED ORAL at 21:14

## 2023-09-12 RX ADMIN — LORAZEPAM 0.5 MG: 0.5 TABLET ORAL at 21:14

## 2023-09-12 RX ADMIN — METRONIDAZOLE 500 MG: 250 TABLET ORAL at 15:03

## 2023-09-12 RX ADMIN — Medication 2 TABLET: at 08:19

## 2023-09-12 RX ADMIN — METOPROLOL TARTRATE 25 MG: 25 TABLET, FILM COATED ORAL at 21:14

## 2023-09-12 RX ADMIN — LORAZEPAM 0.5 MG: 0.5 TABLET ORAL at 08:18

## 2023-09-12 RX ADMIN — LORAZEPAM 0.5 MG: 0.5 TABLET ORAL at 14:24

## 2023-09-12 RX ADMIN — ALLOPURINOL 100 MG: 100 TABLET ORAL at 08:17

## 2023-09-12 RX ADMIN — METRONIDAZOLE 500 MG: 250 TABLET ORAL at 08:17

## 2023-09-12 RX ADMIN — CIPROFLOXACIN 500 MG: 500 TABLET, FILM COATED ORAL at 08:17

## 2023-09-12 RX ADMIN — Medication 1 MG: at 08:17

## 2023-09-12 RX ADMIN — HYDROXYZINE HYDROCHLORIDE 50 MG: 50 TABLET ORAL at 21:14

## 2023-09-12 RX ADMIN — CIPROFLOXACIN 500 MG: 500 TABLET, FILM COATED ORAL at 21:14

## 2023-09-12 RX ADMIN — METOPROLOL TARTRATE 25 MG: 25 TABLET, FILM COATED ORAL at 08:19

## 2023-09-12 RX ADMIN — METFORMIN HYDROCHLORIDE 1000 MG: 500 TABLET ORAL at 16:03

## 2023-09-12 RX ADMIN — HYDROXYZINE HYDROCHLORIDE 50 MG: 50 TABLET ORAL at 08:19

## 2023-09-12 RX ADMIN — APIXABAN 5 MG: 5 TABLET, FILM COATED ORAL at 08:17

## 2023-09-12 RX ADMIN — METFORMIN HYDROCHLORIDE 1000 MG: 500 TABLET ORAL at 08:17

## 2023-09-12 RX ADMIN — PANTOPRAZOLE SODIUM 40 MG: 40 TABLET, DELAYED RELEASE ORAL at 08:19

## 2023-09-12 RX ADMIN — CETIRIZINE HYDROCHLORIDE 10 MG: 10 TABLET, FILM COATED ORAL at 08:17

## 2023-09-12 RX ADMIN — TRAZODONE HYDROCHLORIDE 50 MG: 50 TABLET ORAL at 21:14

## 2023-09-12 RX ADMIN — Medication 100 MG: at 08:19

## 2023-09-12 RX ADMIN — METRONIDAZOLE 500 MG: 250 TABLET ORAL at 21:14

## 2023-09-12 RX ADMIN — APIXABAN 5 MG: 5 TABLET, FILM COATED ORAL at 21:14

## 2023-09-12 RX ADMIN — Medication 1 TABLET: at 08:19

## 2023-09-12 RX ADMIN — FAMOTIDINE 40 MG: 20 TABLET, FILM COATED ORAL at 21:14

## 2023-09-12 NOTE — PROGRESS NOTES
"INPATIENT PSYCHIATRIC PROGRESS NOTE    Name:  Chele Khan  :  1975  MRN:  [id number]  Visit Number:  [id number]  Length of stay:  4    SUBJECTIVE    CC/Focus of Exam: alcohol use    INTERVAL HISTORY:  The patient reports he is feeling better today and says that most of the withdrawals have passed. He is also agreeable to start Eliquis and is aware that it is covered by his insurance. He states he is feeling depressed about what may happen after he leaves here, he is worried that his wife may leave him.  Depression rating 810  Anxiety rating 10  Sleep:not very good  Withdrawal sx: none reported.  Craving: 3/10    Review of Systems   Constitutional: Negative.    Respiratory: Negative.     Cardiovascular: Negative.    Gastrointestinal: Negative.      OBJECTIVE    Temp:  [97.2 °F (36.2 °C)-98.3 °F (36.8 °C)] 97.5 °F (36.4 °C)  Heart Rate:  [] 99  Resp:  [16-20] 18  BP: (123-150)/() 130/88    MENTAL STATUS EXAM:  Appearance:Casually dressed, good hygeine.   Cooperation:Cooperative  Psychomotor: No psychomotor agitation/retardation, No EPS, No motor tics  Speech-normal rate, amount.  Mood \"depressed\"   Affect-congruent, appropriate, stable  Thought Content-goal directed, no delusional material present  Thought process-linear, organized.  Suicidality: No SI  Homicidality: No HI  Perception: No AH/VH  Insight-fair   Judgement-fair    Lab Results (last 24 hours)       ** No results found for the last 24 hours. **               Imaging Results (Last 24 Hours)       ** No results found for the last 24 hours. **               ECG/EMG Results (most recent)       None             ALLERGIES: Patient has no known allergies.    Medication Review:   Scheduled Medications:  allopurinol, 100 mg, Oral, Daily  apixaban, 5 mg, Oral, Q12H  atorvastatin, 80 mg, Oral, Nightly  B-complex with vitamin C, 2 tablet, Oral, Daily  cetirizine, 10 mg, Oral, Daily  ciprofloxacin, 500 mg, Oral, Q12H  famotidine, 40 mg, " Oral, Nightly  folic acid, 1 mg, Oral, Daily  LORazepam, 0.5 mg, Oral, 3 times per day  metFORMIN, 1,000 mg, Oral, BID With Meals  metoprolol tartrate, 25 mg, Oral, BID  metroNIDAZOLE, 500 mg, Oral, TID  multivitamin with minerals, 1 tablet, Oral, Daily  nicotine, 1 patch, Transdermal, Q24H  pantoprazole, 40 mg, Oral, Q AM  thiamine, 100 mg, Oral, Daily         PRN Medications:    aluminum-magnesium hydroxide-simethicone    benzonatate    benztropine **OR** benztropine    busPIRone    famotidine    hydrOXYzine    loperamide    [] LORazepam **FOLLOWED BY** [] LORazepam **FOLLOWED BY** [] LORazepam **FOLLOWED BY** LORazepam    magnesium hydroxide    ondansetron    Pharmacy Consult    sodium chloride    traZODone   All medications reviewed.    ASSESSMENT & PLAN:    Alcohol Use Disorder, severe  Alcohol Withdrawal  - Continue Ativan detox protocol  - Thiamine 100 mg daily     Unspecified Anxiety Disorder  - Buspirone 20mg TID     Recent GI infection  - Patient reports that he was hospitalized X1 day at [facility] in [place], [place] several weeks ago for a GI illness. Sent home on Ciprofloxacin 500mg BID and metronidazole 500mg TID.  Will continue these to complete the course.  - Patient will need followup appointment with PCP after discharge.      Type II Diabetes   - Metformin     H/o DVT  - Eliquis  - Appreciate hospitalist consult     Dyslipidemia  - atorvastatin     Elevated AST  - AST 77 on admission  - Likely secondary to alcohol use  - Monitor     Special precautions: Special Precautions Level 4 (q30 min checks).    Behavioral Health Treatment Plan and Problem List: I have reviewed and approved the Behavioral Health Treatment Plan and Problem list.  The patient has had a chance to review and agrees with the treatment plan.    Copied text in portions of this note has been reviewed and is accurate as of 23         Clinician:  Lacho Walton MD  23  13:25 EDT

## 2023-09-12 NOTE — PLAN OF CARE
Goal Outcome Evaluation:  Plan of Care Reviewed With: patient  Patient Agreement with Plan of Care: agrees     Progress: improving  Outcome Evaluation: Pt cooperative with staff. Pt states appetite has improved and reports feeling unrested and difficulty staying asleep. Pt rates anxiety 6/10 and depression 6/10. Pt denies SI/HI and AVH.

## 2023-09-12 NOTE — PLAN OF CARE
Goal Outcome Evaluation:        Problem: Adult Behavioral Health Plan of Care  Goal: Patient-Specific Goal (Individualization)  Outcome: Ongoing, Progressing  Flowsheets  Taken 9/9/2023 1538 by Genet Jeffries  Patient Personal Strengths:   family/social support   motivated for recovery   motivated for treatment  Patient-Specific Goals (Include Timeframe): Identify 2-3 coping skills, address relaspe prevention methods, complete aftercare plan, complete safety plan, and deny SI/HI prior to discharge.  Patient Vulnerabilities: substance abuse/addiction  Taken 9/9/2023 0010 by Yvonne Barnett RN  Individualized Care Needs: plans on going to [facility]  Anxieties, Fears or Concerns: apprehensive about admission  Goal: Optimized Coping Skills in Response to Life Stressors  Outcome: Ongoing, Progressing  Flowsheets (Taken 9/9/2023 1538 by Genet Jeffries)  Optimized Coping Skills in Response to Life Stressors: making progress toward outcome  Intervention: Promote Effective Coping Strategies  Flowsheets (Taken 9/12/2023 1138)  Supportive Measures:   active listening utilized   counseling provided   decision-making supported   goal-setting facilitated   verbalization of feelings encouraged   self-responsibility promoted   self-reflection promoted   self-care encouraged   positive reinforcement provided  Goal: Develops/Participates in Therapeutic [place] to Support Successful Transition  Outcome: Ongoing, Progressing  Flowsheets (Taken 9/9/2023 1538 by Genet Jeffries)  Develops/Participates in Therapeutic [place] to Support Successful Transition: making progress toward outcome  Intervention: Foster Therapeutic [place]  Flowsheets (Taken 9/12/2023 1138)  Trust Relationship/Rapport:   care explained   questions encouraged   choices provided   reassurance provided   emotional support provided   thoughts/feelings acknowledged   empathic listening provided   questions answered  Intervention:  Mutually Develop Transition Plan  Flowsheets  Taken 9/12/2023 1137 by Nelli Mckenna CSW  Discharge Coordination/Progress: Patient has [facility] Medicaid. Patient plans to return to [facility] when stable, agency to transport.  Transportation Anticipated: agency  Transportation Concerns: none  Current Discharge Risk: substance use/abuse  Concerns to be Addressed:   substance/tobacco abuse/use   cognitive/perceptual   coping/stress   mental health   discharge planning  Readmission Within the Last 30 Days: no previous admission in last 30 days  Patient/Family Anticipated Services at Transition: rehabilitation services  Patient's Choice of Community Agency(s): [facility]  Patient/Family Anticipates Transition to: inpatient rehabilitation facility  Offered/Gave Vendor List: no  Taken 9/11/2023 1132 by Nelli Mckenna CSW  Outpatient/Agency/Support Group Needs: residential services  Transition Support:   follow-up care discussed   follow-up care coordinated   community resources reviewed   crisis management plan promoted   crisis management plan verbalized  Taken 9/9/2023 1538 by Genet Jeffries  Anticipated Discharge Disposition: residential substance use unit      DATA:  Therapist met with Patient individually this date. Patient agreeable to discuss current treatment progress and discharge concerns.     CLINICAL MANUVERING/INTERVENTIONS:  Assisted Patient in processing session content; acknowledged and normalized Patient’s thoughts, feelings, and concerns by utilizing a person-centered approach in efforts to build appropriate rapport and a positive therapeutic relationship with open and honest communication. Allowed Patient to ventilate regarding current stressors and triggers for negative emotions and thoughts in a safe nonjudgmental environment with unconditional positive regard, active listening skills, and empathy. Therapist implemented motivational interviewing techniques to assist Patient with exploring  personal growth and change and discussed distress tolerance skills, self soothing techniques, and applied cognitive behavioral strategies to facilitate identification of maladaptive patterns of thinking and behavior.Therapist utilized dialectical behavior techniques to teach and model emotional regulation and relaxation methods. Therapist assisted Patient with identifying and implementing healthier coping strategies. Therapist assisted Patient with safety planning; Patient agreed to continue honest communication with Treatment Team while inpatient and identify any SI/HI. Patient encouraged to seek nearest ER or contact 911 if danger to self or others post discharge.     ASSESSMENT:    Therapist met with patient on this date, patient continues to receive treatment for alcohol detox. Patient reports feeling depressed, denies current SI/HI/AVH. Patient denies experiencing any active withdrawal symptoms. Patient will return to [facility] at discharge, agency will provide transportation. Patient discussed wanting to have better control of his emotions, especially in public settings. He often becomes very tearful when talking about certain topics. Therapist discussed and provided reading material on healthy coping skills and normalizing emotions.     PLAN:   Patient will continue stabilization. Patient will continue to receive services offered by Treatment Team.     Return to [facility] when stable.

## 2023-09-12 NOTE — PROGRESS NOTES
Pharmacy checked on cost of  Eliquis. According to patient's insurance, the copay is $0 for a one month supply.   No other issues identified at this time.      Rivaroxaban is also covered on the patient's insurance (previously filled for tx of DVT prior to warfarin prescription)  Patient was on warfarin prior to this admission since he was without insurance in August and unable to afford previously prescribed DOAC.     Thank you,    Leslie Ferrari [facility]  09/12/23  14:21 EDT

## 2023-09-12 NOTE — PROGRESS NOTES
Pt not seen or examined today. I have reviewed his VS and labs. I have discussed his care with JUSTIN Camejo who does not report any obvious signs of bleeding since initiation of Eliquis. H&H remain stable. Pharmacy has confirmed coverage for Eliquis upon discharge. Pt will need outpatient follow up with hematology in the setting of recurrent DVT's. Will sign off at this time. Please do not hesitate to call with any questions or concerns.     Ronnie Marroquin DO  09/12/23  14:46 EDT

## 2023-09-13 VITALS
RESPIRATION RATE: 18 BRPM | DIASTOLIC BLOOD PRESSURE: 98 MMHG | OXYGEN SATURATION: 98 % | WEIGHT: 231.4 LBS | HEIGHT: 72 IN | HEART RATE: 76 BPM | TEMPERATURE: 96.9 F | SYSTOLIC BLOOD PRESSURE: 140 MMHG | BODY MASS INDEX: 31.34 KG/M2

## 2023-09-13 PROBLEM — M10.9 GOUT: Status: ACTIVE | Noted: 2023-09-13

## 2023-09-13 PROBLEM — I10 HTN (HYPERTENSION): Status: ACTIVE | Noted: 2023-09-13

## 2023-09-13 PROBLEM — F10.20 ALCOHOL USE DISORDER, SEVERE, DEPENDENCE: Status: ACTIVE | Noted: 2023-09-08

## 2023-09-13 PROCEDURE — 99238 HOSP IP/OBS DSCHRG MGMT 30/<: CPT | Performed by: PSYCHIATRY & NEUROLOGY

## 2023-09-13 RX ADMIN — Medication 2 TABLET: at 08:13

## 2023-09-13 RX ADMIN — Medication 1 TABLET: at 08:13

## 2023-09-13 RX ADMIN — CIPROFLOXACIN 500 MG: 500 TABLET, FILM COATED ORAL at 08:11

## 2023-09-13 RX ADMIN — METFORMIN HYDROCHLORIDE 1000 MG: 500 TABLET ORAL at 08:11

## 2023-09-13 RX ADMIN — Medication 1 MG: at 08:11

## 2023-09-13 RX ADMIN — METOPROLOL TARTRATE 25 MG: 25 TABLET, FILM COATED ORAL at 08:13

## 2023-09-13 RX ADMIN — ALLOPURINOL 100 MG: 100 TABLET ORAL at 08:11

## 2023-09-13 RX ADMIN — PANTOPRAZOLE SODIUM 40 MG: 40 TABLET, DELAYED RELEASE ORAL at 08:13

## 2023-09-13 RX ADMIN — Medication 100 MG: at 08:13

## 2023-09-13 RX ADMIN — CETIRIZINE HYDROCHLORIDE 10 MG: 10 TABLET, FILM COATED ORAL at 08:11

## 2023-09-13 RX ADMIN — METRONIDAZOLE 500 MG: 250 TABLET ORAL at 08:11

## 2023-09-13 RX ADMIN — APIXABAN 5 MG: 5 TABLET, FILM COATED ORAL at 08:11

## 2023-09-13 NOTE — DISCHARGE SUMMARY
":  1975  MRN:  [id number]  Visit Number:  [id number]      Date of Admission:2023   Date of Discharge:  2023    Discharge Diagnosis:  Principal Problem:    Alcohol use disorder, severe, dependence  Active Problems:    Type 2 diabetes mellitus without complication, without long-term current use of insulin    Recurrent deep vein thrombosis (DVT)    HTN (hypertension)    Gout        Admission Diagnosis:  Alcohol abuse [F10.10]     HPI  Chele Khan is a 48 y.o. male who was admitted on 2023 and evaluated on 2023 with complaints of alcohol use and withdrawals.   For details please see H&P dated 23.     Hospital Course  Patient is a 48 y.o. male presented with alcohol use and withdrawals. . The patient was admitted to the [facility] detox recovery unit for safety, further evaluation and treatment.  The patient was started on Ativan detox and he was able to complete it without any complications. He was also continued on previous antibiotics for UTI and intestinal infection.   He was resumed on his home medications. Warfarin was switched to Eliquis.   The patient was also able to take part in individual and group counseling sessions and work on appropriate coping skills.  The patient made steady improvement in his withdrawals and  mood and expressed feeling more positive and hopeful about future. Sleep and appetite were improved.  The day of discharge the patient was calm, cooperative and pleasant. Mood was reported to be good, and denied SI/HI/AVH. Also reported no medication side effects.  .      Mental Status Exam upon discharge:   Mood \"good\"   Affect-congruent, appropriate, stable  Thought Content-goal directed, no delusional material present  Thought process-linear, organized.  Suicidality: No SI  Homicidality: No HI  Perception: No AH/VH    Procedures Performed         Consults:   Consults       Date and Time Order Name Status Description    2023  1:16 PM Inpatient " Hospitalist Consult Completed             Pertinent Test Results:   Admission on 09/08/2023   Component Date Value Ref Range Status    Glucose 09/09/2023 93  65 - 99 mg/dL Final    BUN 09/09/2023 11  6 - 20 mg/dL Final    Creatinine 09/09/2023 0.96  0.76 - 1.27 mg/dL Final    Sodium 09/09/2023 140  136 - 145 mmol/L Final    Potassium 09/09/2023 4.2  3.5 - 5.2 mmol/L Final    2+ Hemolysis     Specimen hemolyzed.  Results may be affected.    Chloride 09/09/2023 107  98 - 107 mmol/L Final    CO2 09/09/2023 23.9  22.0 - 29.0 mmol/L Final    Calcium 09/09/2023 8.4 (L)  8.6 - 10.5 mg/dL Final    Total Protein 09/09/2023 6.1  6.0 - 8.5 g/dL Final    Albumin 09/09/2023 3.0 (L)  3.5 - 5.2 g/dL Final    ALT (SGPT) 09/09/2023 34  1 - 41 U/L Final    Specimen hemolyzed.  Results may be affected.    AST (SGOT) 09/09/2023 77 (H)  1 - 40 U/L Final    Alkaline Phosphatase 09/09/2023 143 (H)  39 - 117 U/L Final    Total Bilirubin 09/09/2023 2.0 (H)  0.0 - 1.2 mg/dL Final    Globulin 09/09/2023 3.1  gm/dL Final    A/G Ratio 09/09/2023 1.0  g/dL Final    BUN/Creatinine Ratio 09/09/2023 11.5  7.0 - 25.0 Final    Anion Gap 09/09/2023 9.1  5.0 - 15.0 mmol/L Final    eGFR 09/09/2023 97.5  >60.0 mL/min/1.73 Final    Protime 09/09/2023 14.3  12.1 - 14.7 Seconds Final    INR 09/09/2023 1.06  0.90 - 1.10 Final    Protime 09/10/2023 13.9  12.1 - 14.7 Seconds Final    INR 09/10/2023 1.01  0.90 - 1.10 Final    Protime 09/11/2023 12.9  12.1 - 14.7 Seconds Final    INR 09/11/2023 0.92  0.90 - 1.10 Final    WBC 09/12/2023 7.64  3.40 - 10.80 10*3/mm3 Final    RBC 09/12/2023 3.95 (L)  4.14 - 5.80 10*6/mm3 Final    Hemoglobin 09/12/2023 13.1  13.0 - 17.7 g/dL Final    Hematocrit 09/12/2023 38.9  37.5 - 51.0 % Final    MCV 09/12/2023 98.5 (H)  79.0 - 97.0 fL Final    MCH 09/12/2023 33.2 (H)  26.6 - 33.0 pg Final    MCHC 09/12/2023 33.7  31.5 - 35.7 g/dL Final    RDW 09/12/2023 13.1  12.3 - 15.4 % Final    RDW-SD 09/12/2023 45.9  37.0 - 54.0 fl Final     MPV 09/12/2023 10.7  6.0 - 12.0 fL Final    Platelets 09/12/2023 166  140 - 450 10*3/mm3 Final    Neutrophil % 09/12/2023 65.0  42.7 - 76.0 % Final    Lymphocyte % 09/12/2023 23.7  19.6 - 45.3 % Final    Monocyte % 09/12/2023 7.2  5.0 - 12.0 % Final    Eosinophil % 09/12/2023 2.9  0.3 - 6.2 % Final    Basophil % 09/12/2023 0.8  0.0 - 1.5 % Final    Immature Grans % 09/12/2023 0.4  0.0 - 0.5 % Final    Neutrophils, Absolute 09/12/2023 4.97  1.70 - 7.00 10*3/mm3 Final    Lymphocytes, Absolute 09/12/2023 1.81  0.70 - 3.10 10*3/mm3 Final    Monocytes, Absolute 09/12/2023 0.55  0.10 - 0.90 10*3/mm3 Final    Eosinophils, Absolute 09/12/2023 0.22  0.00 - 0.40 10*3/mm3 Final    Basophils, Absolute 09/12/2023 0.06  0.00 - 0.20 10*3/mm3 Final    Immature Grans, Absolute 09/12/2023 0.03  0.00 - 0.05 10*3/mm3 Final    nRBC 09/12/2023 0.0  0.0 - 0.2 /100 WBC Final   Admission on 09/08/2023, Discharged on 09/08/2023   Component Date Value Ref Range Status    Glucose 09/08/2023 109 (H)  65 - 99 mg/dL Final    BUN 09/08/2023 10  6 - 20 mg/dL Final    Creatinine 09/08/2023 1.00  0.76 - 1.27 mg/dL Final    Sodium 09/08/2023 143  136 - 145 mmol/L Final    Potassium 09/08/2023 3.3 (L)  3.5 - 5.2 mmol/L Final    Slight hemolysis detected by analyzer. Results may be affected.    Chloride 09/08/2023 105  98 - 107 mmol/L Final    CO2 09/08/2023 19.0 (L)  22.0 - 29.0 mmol/L Final    Calcium 09/08/2023 8.7  8.6 - 10.5 mg/dL Final    Total Protein 09/08/2023 6.5  6.0 - 8.5 g/dL Final    Albumin 09/08/2023 3.6  3.5 - 5.2 g/dL Final    ALT (SGPT) 09/08/2023 40  1 - 41 U/L Final    AST (SGOT) 09/08/2023 82 (H)  1 - 40 U/L Final    Alkaline Phosphatase 09/08/2023 158 (H)  39 - 117 U/L Final    Total Bilirubin 09/08/2023 1.4 (H)  0.0 - 1.2 mg/dL Final    Globulin 09/08/2023 2.9  gm/dL Final    A/G Ratio 09/08/2023 1.2  g/dL Final    BUN/Creatinine Ratio 09/08/2023 10.0  7.0 - 25.0 Final    Anion Gap 09/08/2023 19.0 (H)  5.0 - 15.0 mmol/L Final     eGFR 09/08/2023 92.8  >60.0 mL/min/1.73 Final    Color, UA 09/08/2023 Orange (A)  Yellow, Straw Final    Appearance, UA 09/08/2023 Clear  Clear Final    pH, UA 09/08/2023 6.0  5.0 - 8.0 Final    Specific Gravity, UA 09/08/2023 1.025  1.005 - 1.030 Final    Glucose, UA 09/08/2023 Negative  Negative Final    Ketones, UA 09/08/2023 Trace (A)  Negative Final    Bilirubin, UA 09/08/2023 Small (1+) (A)  Negative Final    Blood, UA 09/08/2023 Negative  Negative Final    Protein, UA 09/08/2023 Trace (A)  Negative Final    Leuk Esterase, UA 09/08/2023 Trace (A)  Negative Final    Nitrite, UA 09/08/2023 Positive (A)  Negative Final    Urobilinogen, UA 09/08/2023 1.0 E.U./dL  0.2 - 1.0 E.U./dL Final    Ethanol 09/08/2023 35 (H)  0 - 10 mg/dL Final    Ethanol % 09/08/2023 0.035  % Final    THC, Screen, Urine 09/08/2023 Negative  Negative Final    Phencyclidine (PCP), Urine 09/08/2023 Negative  Negative Final    Cocaine Screen, Urine 09/08/2023 Negative  Negative Final    Methamphetamine, Ur 09/08/2023 Negative  Negative Final    Opiate Screen 09/08/2023 Negative  Negative Final    Amphetamine Screen, Urine 09/08/2023 Negative  Negative Final    Benzodiazepine Screen, Urine 09/08/2023 Positive (A)  Negative Final    Tricyclic Antidepressants Screen 09/08/2023 Negative  Negative Final    Methadone Screen, Urine 09/08/2023 Negative  Negative Final    Barbiturates Screen, Urine 09/08/2023 Negative  Negative Final    Oxycodone Screen, Urine 09/08/2023 Negative  Negative Final    Propoxyphene Screen 09/08/2023 Negative  Negative Final    Buprenorphine, Screen, Urine 09/08/2023 Negative  Negative Final    Magnesium 09/08/2023 1.6  1.6 - 2.6 mg/dL Final    Protime 09/08/2023 15.0 (H)  12.1 - 14.7 Seconds Final    INR 09/08/2023 1.13 (H)  0.90 - 1.10 Final    WBC 09/08/2023 5.75  3.40 - 10.80 10*3/mm3 Final    RBC 09/08/2023 4.52  4.14 - 5.80 10*6/mm3 Final    Hemoglobin 09/08/2023 14.8  13.0 - 17.7 g/dL Final    Hematocrit 09/08/2023  43.1  37.5 - 51.0 % Final    MCV 09/08/2023 95.4  79.0 - 97.0 fL Final    MCH 09/08/2023 32.7  26.6 - 33.0 pg Final    MCHC 09/08/2023 34.3  31.5 - 35.7 g/dL Final    RDW 09/08/2023 12.8  12.3 - 15.4 % Final    RDW-SD 09/08/2023 44.2  37.0 - 54.0 fl Final    MPV 09/08/2023 9.8  6.0 - 12.0 fL Final    Platelets 09/08/2023 190  140 - 450 10*3/mm3 Final    Neutrophil % 09/08/2023 55.7  42.7 - 76.0 % Final    Lymphocyte % 09/08/2023 32.9  19.6 - 45.3 % Final    Monocyte % 09/08/2023 9.7  5.0 - 12.0 % Final    Eosinophil % 09/08/2023 0.5  0.3 - 6.2 % Final    Basophil % 09/08/2023 0.9  0.0 - 1.5 % Final    Immature Grans % 09/08/2023 0.3  0.0 - 0.5 % Final    Neutrophils, Absolute 09/08/2023 3.20  1.70 - 7.00 10*3/mm3 Final    Lymphocytes, Absolute 09/08/2023 1.89  0.70 - 3.10 10*3/mm3 Final    Monocytes, Absolute 09/08/2023 0.56  0.10 - 0.90 10*3/mm3 Final    Eosinophils, Absolute 09/08/2023 0.03  0.00 - 0.40 10*3/mm3 Final    Basophils, Absolute 09/08/2023 0.05  0.00 - 0.20 10*3/mm3 Final    Immature Grans, Absolute 09/08/2023 0.02  0.00 - 0.05 10*3/mm3 Final    nRBC 09/08/2023 0.0  0.0 - 0.2 /100 WBC Final    QT Interval 09/08/2023 344  ms Final    QTC Interval 09/08/2023 478  ms Final    Fentanyl, Urine 09/08/2023 Negative  Negative Final    RBC, UA 09/08/2023 0-2  None Seen, 0-2 /HPF Final    WBC, UA 09/08/2023 0-2  None Seen, 0-2 /HPF Final    Bacteria, UA 09/08/2023 None Seen  None Seen /HPF Final    Squamous Epithelial Cells, UA 09/08/2023 0-2  None Seen, 0-2 /HPF Final    Hyaline Casts, UA 09/08/2023 None Seen  None Seen /LPF Final    Methodology 09/08/2023 Automated Microscopy   Final   Lab on 08/24/2023   Component Date Value Ref Range Status    INR 08/24/2023 3.4 (H)  0.9 - 1.2 Final    Reference interval is for non-anticoagulated patients.  Suggested INR therapeutic range for Vitamin K  antagonist therapy:     Standard Dose (moderate intensity                    therapeutic range):       2.0 - 3.0     Higher  intensity therapeutic range       2.5 - 3.5    Protime 08/24/2023 33.5 (H)  9.1 - 12.0 sec Final        Condition on Discharge:  improved    Vital Signs  Temp:  [96.9 °F (36.1 °C)-97.6 °F (36.4 °C)] 96.9 °F (36.1 °C)  Heart Rate:  [76-83] 76  Resp:  [16-20] 18  BP: (116-140)/(74-98) 140/98      Discharge Disposition:  Home or Self Care    Discharge Medications:     Discharge Medications        New Medications        Instructions Start Date   apixaban 5 MG tablet tablet  Commonly known as: ELIQUIS   5 mg, Oral, Every 12 Hours Scheduled             Continue These Medications        Instructions Start Date   allopurinol 100 MG tablet  Commonly known as: ZYLOPRIM   100 mg, Oral, Daily      atorvastatin 80 MG tablet  Commonly known as: LIPITOR   80 mg, Oral, Every Night at Bedtime      busPIRone 15 MG tablet  Commonly known as: BUSPAR   15 mg, Oral, 3 Times Daily PRN      ciprofloxacin 500 MG tablet  Commonly known as: CIPRO   500 mg, Oral, 2 Times Daily      famotidine 40 MG tablet  Commonly known as: PEPCID   40 mg, Oral, Nightly      folic acid 1 MG tablet  Commonly known as: FOLVITE   1 mg, Oral, Daily      loratadine 10 MG tablet  Commonly known as: CLARITIN   10 mg, Oral, Daily      metFORMIN 1000 MG tablet  Commonly known as: GLUCOPHAGE   TAKE 1 TABLET BY MOUTH TWICE DAILY WITH MEALS      metoprolol tartrate 25 MG tablet  Commonly known as: LOPRESSOR   25 mg, Oral, 2 Times Daily      metroNIDAZOLE 500 MG tablet  Commonly known as: FLAGYL   500 mg, Oral, 3 Times Daily      nicotine 21 MG/24HR patch  Commonly known as: NICODERM CQ   1 patch, Transdermal, Every 24 Hours      omeprazole 40 MG capsule  Commonly known as: priLOSEC   40 mg, Oral, Daily      ondansetron 4 MG tablet  Commonly known as: ZOFRAN   4 mg, Oral, Every 8 Hours PRN      thiamine 100 MG tablet  tablet  Commonly known as: VITAMIN B-1   100 mg, Oral, Daily             Stop These Medications      Aspirin Low Dose 81 MG chewable tablet  Generic drug:  aspirin     multivitamin tablet tablet     warfarin 5 MG tablet  Commonly known as: COUMADIN              Discharge Diet: Consistent carbohydrate     Activity at Discharge: As tolerated     Follow-up Appointments  Future Appointments   Date Time Provider Department Center   9/15/2023  2:00 PM Amber Scott PA [facility]       Time spent in discharge: < 30 min    Clinician:   Lacho Walton MD  09/13/23  11:46 EDT

## 2023-09-13 NOTE — CASE MANAGEMENT/SOCIAL WORK
Case Management/Social Work    Patient Name:  Chele Khan  YOB: 1975  MRN: [id number]  Admit Date:  9/8/2023    Patient expected to discharge back to [facility] on this date, agency to provide transportation around 13:00. Patient reports improvement in mood and withdrawal symptoms, denies current SI/HI/AVH. Therapist has discussed healthy coping skills and relapse prevention with patient. Therapist has assisted patient in identifying risk factors which would indicate the need for higher level of care including thoughts to harm self or others and/or self-harming behavior. Encouraged patient to notify rehab staff, call [phone] or present to the nearest emergency room should any of these events occur.     Electronically signed by:  JENN Sewell  09/13/23 10:38 EDT

## 2023-09-13 NOTE — PLAN OF CARE
Problem: Adult Behavioral Health Plan of Care  Goal: Plan of Care Review  Outcome: Adequate for Care Transition  Flowsheets (Taken 9/13/2023 1236)  Progress: improving  Plan of Care Reviewed With: patient  Patient Agreement with Plan of Care: agrees  Outcome Evaluation: Pt is being discharged today.   Goal Outcome Evaluation:  Plan of Care Reviewed With: patient  Patient Agreement with Plan of Care: agrees     Progress: improving  Outcome Evaluation: Pt is being discharged today.

## 2023-09-13 NOTE — PLAN OF CARE
Goal Outcome Evaluation:  Plan of Care Reviewed With: patient  Patient Agreement with Plan of Care: agrees     Progress: improving    Pt slept well, appetite good, and participated in group.

## 2023-09-21 ENCOUNTER — TELEPHONE (OUTPATIENT)
Dept: FAMILY MEDICINE CLINIC | Facility: CLINIC | Age: 48
End: 2023-09-21

## 2023-09-21 NOTE — TELEPHONE ENCOUNTER
Caller: BRIANNE VELEZ    Relationship to patient: Emergency Contact    Best call back number: [phone]     Patient is needing: PATIENTS WIFE STATES HE MISSED HIS LAST APPOINTMENT AS HE ADMITTED HIMSELF INTO REHAB. SHE STATES SHE BELIEVES HE MAY STAY FOR THE ENTIRE PROGRAM WHICH WOULD BE 6 MONTHS IN LENGTH.     PATIENTS WIFE IS INFORMING THE OFFICE THAT HE WILL SCHEDULE A FOLLOW UP APPOINTMENT ONCE HE IS OUT OF REHAB.

## 2023-10-27 NOTE — TELEPHONE ENCOUNTER
Caller: BRIANNE VELEZ    Relationship: Emergency Contact    Best call back number:     Requested Prescriptions:   Requested Prescriptions     Pending Prescriptions Disp Refills    metoprolol tartrate (LOPRESSOR) 25 MG tablet 180 tablet 0     Sig: Take 1 tablet by mouth 2 (Two) Times a Day.        Pharmacy where request should be sent: [id number] - [place], [place] - [address] [facility] [address] - [phone] [facility] [phone] FX     Last office visit with prescribing clinician: 8/16/2023   Last telemedicine visit with prescribing clinician: Visit date not found   Next office visit with prescribing clinician: Visit date not found     Additional details provided by patient: THE PATIENT IS OUT AND HIS SISTER  ASK THAT IT BE CALLED IN TODAY.  SISTER BRIANNE    Does the patient have less than a 3 day supply:  [x] Yes  [] No    Would you like a call back once the refill request has been completed: [] Yes [x] No    If the office needs to give you a call back, can they leave a voicemail: [] Yes [x] No    Ernst Gomez Rep   10/27/23 15:27 EDT         clinical staff to follow up on this request.”

## 2024-03-05 ENCOUNTER — OFFICE VISIT (OUTPATIENT)
Dept: FAMILY MEDICINE CLINIC | Facility: CLINIC | Age: 49
End: 2024-03-05

## 2024-03-05 VITALS
SYSTOLIC BLOOD PRESSURE: 124 MMHG | DIASTOLIC BLOOD PRESSURE: 86 MMHG | BODY MASS INDEX: 37.93 KG/M2 | HEIGHT: 72 IN | TEMPERATURE: 97.6 F | WEIGHT: 280 LBS | RESPIRATION RATE: 18 BRPM | HEART RATE: 87 BPM | OXYGEN SATURATION: 99 %

## 2024-03-05 DIAGNOSIS — Z76.0 ENCOUNTER FOR MEDICATION REFILL: ICD-10-CM

## 2024-03-05 DIAGNOSIS — I10 HYPERTENSION, UNSPECIFIED TYPE: ICD-10-CM

## 2024-03-05 DIAGNOSIS — F41.9 ANXIETY AND DEPRESSION: ICD-10-CM

## 2024-03-05 DIAGNOSIS — E78.2 MIXED HYPERLIPIDEMIA: ICD-10-CM

## 2024-03-05 DIAGNOSIS — F32.A ANXIETY AND DEPRESSION: ICD-10-CM

## 2024-03-05 DIAGNOSIS — Z12.11 SCREENING FOR COLON CANCER: ICD-10-CM

## 2024-03-05 DIAGNOSIS — Z76.89 ENCOUNTER TO ESTABLISH CARE: Primary | ICD-10-CM

## 2024-03-05 DIAGNOSIS — Z00.00 ENCOUNTER FOR ANNUAL PHYSICAL EXAM: ICD-10-CM

## 2024-03-05 PROBLEM — Z86.010 HISTORY OF COLON POLYPS: Status: ACTIVE | Noted: 2024-03-05

## 2024-03-05 PROBLEM — F17.200 SMOKING: Status: ACTIVE | Noted: 2024-03-05

## 2024-03-05 PROBLEM — Z86.0100 HISTORY OF COLON POLYPS: Status: ACTIVE | Noted: 2024-03-05

## 2024-03-05 PROBLEM — I21.4 ACUTE NON-ST ELEVATION MYOCARDIAL INFARCTION (NSTEMI): Status: ACTIVE | Noted: 2018-06-25

## 2024-03-05 PROBLEM — K52.9 COLITIS: Status: ACTIVE | Noted: 2021-10-05

## 2024-03-05 PROBLEM — Z87.898 HISTORY OF ALCOHOL USE: Status: ACTIVE | Noted: 2023-09-01

## 2024-03-05 PROBLEM — M1A.09X0 CHRONIC GOUT OF MULTIPLE SITES: Status: ACTIVE | Noted: 2024-03-05

## 2024-03-05 PROBLEM — IMO0001 SMOKING: Status: ACTIVE | Noted: 2024-03-05

## 2024-03-05 PROBLEM — I25.10 CAD IN NATIVE ARTERY: Status: ACTIVE | Noted: 2024-03-05

## 2024-03-05 PROBLEM — K22.70 BARRETT'S ESOPHAGUS DETERMINED BY BIOPSY: Status: ACTIVE | Noted: 2024-03-05

## 2024-03-05 PROBLEM — E66.01 MORBID OBESITY: Status: ACTIVE | Noted: 2018-06-25

## 2024-03-05 PROBLEM — K92.2 GI BLEED: Status: ACTIVE | Noted: 2023-09-01

## 2024-03-05 PROBLEM — E11.9 DIABETES: Status: ACTIVE | Noted: 2018-06-25

## 2024-03-05 RX ORDER — OMEPRAZOLE 40 MG/1
40 CAPSULE, DELAYED RELEASE ORAL DAILY
Qty: 90 CAPSULE | Refills: 3 | Status: SHIPPED | OUTPATIENT
Start: 2024-03-05

## 2024-03-05 RX ORDER — HYDROXYZINE PAMOATE 50 MG/1
50 CAPSULE ORAL 2 TIMES DAILY
Qty: 60 CAPSULE | Refills: 0 | Status: SHIPPED | OUTPATIENT
Start: 2024-03-05 | End: 2024-04-04

## 2024-03-05 RX ORDER — VITAMIN B COMPLEX
CAPSULE ORAL
COMMUNITY

## 2024-03-05 RX ORDER — VENLAFAXINE 75 MG/1
75 TABLET ORAL 3 TIMES DAILY
COMMUNITY

## 2024-03-05 RX ORDER — ACETAMINOPHEN 500 MG
500 TABLET ORAL EVERY 6 HOURS PRN
COMMUNITY

## 2024-03-05 RX ORDER — VARENICLINE TARTRATE 1 MG/1
1 TABLET, FILM COATED ORAL 2 TIMES DAILY
COMMUNITY

## 2024-03-05 RX ORDER — IBUPROFEN 800 MG/1
800 TABLET ORAL EVERY 8 HOURS PRN
COMMUNITY
End: 2024-03-05

## 2024-03-05 RX ORDER — FAMOTIDINE 40 MG/1
40 TABLET, FILM COATED ORAL DAILY
COMMUNITY

## 2024-03-05 RX ORDER — HYDROXYZINE HYDROCHLORIDE 25 MG/1
25 TABLET, FILM COATED ORAL 3 TIMES DAILY PRN
COMMUNITY
End: 2024-03-05

## 2024-03-05 NOTE — ASSESSMENT & PLAN NOTE
Patient's depression is a recurrent episode that is moderate without psychosis. Depression is active and worsening.    Plan:   Continue current medication therapy     Followup at the next regular appointment.

## 2024-03-05 NOTE — PROGRESS NOTES
Chief Complaint  Establish Care (Continuing care of../)    Subjective        Chele Khan presents to [facility]  C/o 1. Establish care as his PCP 2. Annual Physical 3. Chronic illness 4. Fasting labs   Hypertension  This is a chronic problem. The current episode started more than 1 year ago. The problem has been stable since onset. The problem is controlled. Associated symptoms include anxiety. Pertinent negatives include no chest pain, headaches, palpitations or shortness of breath. Risk factors for coronary artery disease include obesity, male gender, dyslipidemia, diabetes mellitus and smoking/tobacco exposure. Past treatments include lifestyle changes and beta blockers. Current antihypertension treatment includes lifestyle changes and beta blockers. The current treatment provides moderate improvement. There are no compliance problems.  Hypertensive end-organ damage includes CAD/MI. There is no history of angina or kidney disease. There is no history of chronic renal disease or a thyroid problem.   Hyperlipidemia  This is a chronic problem. The current episode started more than 1 year ago. The problem is uncontrolled. Recent lipid tests were reviewed and are high. Exacerbating diseases include obesity. He has no history of chronic renal disease. Factors aggravating his hyperlipidemia include smoking. Pertinent negatives include no chest pain, focal sensory loss, leg pain or shortness of breath. Current antihyperlipidemic treatment includes statins. The current treatment provides moderate improvement of lipids. There are no compliance problems.  Risk factors for coronary artery disease include obesity, male sex, hypertension and diabetes mellitus.   Anxiety  Presents for initial visit. Onset was more than 5 years ago. The problem has been waxing and waning. Symptoms include decreased concentration, depressed mood, excessive worry, irritability, nervous/anxious behavior and  "suicidal ideas. Patient reports no chest pain, palpitations or shortness of breath. Symptoms occur most days. The severity of symptoms is moderate. The patient sleeps 5 hours per night. The quality of sleep is fair. Nighttime awakenings: one to two.     His past medical history is significant for CAD and depression. Past treatments include SSRIs and non-benzodiazephine anxiolytics. The treatment provided mild relief. Compliance with prior treatments has been variable.   Depression  Visit Type: initial  Onset of symptoms: more than 5 years ago  Progression since onset: waxing and waning  Patient presents with the following symptoms: decreased concentration, depressed mood, excessive worry, irritability, nervousness/anxiety and suicidal ideas.  Patient is not experiencing: feelings of hopelessness, feelings of worthlessness, palpitations and shortness of breath.  Frequency of symptoms: most days   Severity: moderate   Sleep per night: 5 hours  Sleep quality: fair  Nighttime awakenings: one to two  Patient has a history of: CAD        Objective   Vital Signs:  /86   Pulse 87   Temp 97.6 °F (36.4 °C) (Temporal)   Resp 18   Ht 182.9 cm (72\")   Wt 127 kg (280 lb)   SpO2 99%   BMI 37.97 kg/m²   Estimated body mass index is 37.97 kg/m² as calculated from the following:    Height as of this encounter: 182.9 cm (72\").    Weight as of this encounter: 127 kg (280 lb).            Physical Exam  Vitals and nursing note reviewed. Exam conducted with a chaperone present.   Constitutional:       General: He is awake.      Appearance: Normal appearance. He is well-developed and well-groomed. He is obese.   HENT:      Head: Normocephalic and atraumatic.      Jaw: There is normal jaw occlusion.      Right Ear: Hearing, tympanic membrane, ear canal and external ear normal.      Left Ear: Hearing, tympanic membrane, ear canal and external ear normal.      Nose: Nose normal.      Mouth/Throat:      Lips: Pink.      Mouth: " Mucous membranes are moist.      Pharynx: Oropharynx is clear.   Eyes:      General: Lids are normal. Vision grossly intact. Gaze aligned appropriately.      Extraocular Movements: Extraocular movements intact.      Conjunctiva/sclera: Conjunctivae normal.      Pupils: Pupils are equal, round, and reactive to light.   Neck:      Trachea: Trachea and phonation normal.   Cardiovascular:      Rate and Rhythm: Normal rate and regular rhythm.      Pulses: Normal pulses.      Heart sounds: Normal heart sounds.   Pulmonary:      Effort: Pulmonary effort is normal.      Breath sounds: Normal breath sounds and air entry.   Abdominal:      General: Abdomen is protuberant. Bowel sounds are normal.      Palpations: Abdomen is soft.   Genitourinary:     Rectum: Normal.   Musculoskeletal:         General: Normal range of motion.      Right shoulder: Normal.      Left shoulder: Normal.      Right upper arm: Normal.      Left upper arm: Normal.      Right elbow: Normal.      Left elbow: Normal.      Right forearm: Normal.      Left forearm: Normal.      Right wrist: Normal.      Left wrist: Normal.      Right hand: Normal.      Left hand: Normal.      Cervical back: Normal, full passive range of motion without pain, normal range of motion and neck supple.      Thoracic back: Normal.      Lumbar back: Normal.      Right hip: Normal.      Left hip: Normal.      Right upper leg: Normal.      Left upper leg: Normal.      Right knee: Normal.      Left knee: Normal.      Right lower leg: Normal. No edema.      Left lower leg: Normal. No edema.      Right ankle: Normal.      Right Achilles Tendon: Normal.      Left ankle: Normal.      Left Achilles Tendon: Normal.      Right foot: Normal.      Left foot: Normal.   Lymphadenopathy:      Cervical: No cervical adenopathy.   Skin:     General: Skin is warm.      Capillary Refill: Capillary refill takes less than 2 seconds.   Neurological:      General: No focal deficit present.      Mental  Status: He is alert and oriented to person, place, and time.      Cranial Nerves: Cranial nerves 2-12 are intact.      Sensory: Sensation is intact.      Motor: Motor function is intact.      Coordination: Coordination is intact.      Gait: Gait is intact.      Deep Tendon Reflexes: Reflexes are normal and symmetric.   Psychiatric:         Attention and Perception: Attention and perception normal.         Mood and Affect: Mood and affect normal.         Speech: Speech normal.         Behavior: Behavior normal. Behavior is cooperative.         Thought Content: Thought content normal.         Cognition and Memory: Cognition and memory normal.         Judgment: Judgment normal.        Result Review :  The following data was reviewed by: Jian Guevara MD on 03/05/2024:  Common labs          9/8/2023    15:21 9/9/2023    04:42 9/12/2023    13:41   Common Labs   Glucose 109  93     BUN 10  11     Creatinine 1.00  0.96     Sodium 143  140     Potassium 3.3  4.2     Chloride 105  107     Calcium 8.7  8.4     Albumin 3.6  3.0     Total Bilirubin 1.4  2.0     Alkaline Phosphatase 158  143     AST (SGOT) 82  77     ALT (SGPT) 40  34     WBC 5.75   7.64    Hemoglobin 14.8   13.1    Hematocrit 43.1   38.9    Platelets 190   166      Data reviewed : Radiologic studies 8/16/23 CXR           Assessment and Plan   Diagnoses and all orders for this visit:    1. Encounter to establish care (Primary)    2. Encounter for annual physical exam  -     CBC & Differential; Future  -     Comprehensive Metabolic Panel; Future  -     Lipid Panel; Future  -     Hemoglobin A1c; Future  -     TSH; Future  -     Hepatitis C Antibody; Future  -     Vitamin D,25-Hydroxy; Future  -     Vitamin B12; Future  -     MicroAlbumin, Urine, Random - Urine, Clean Catch  -     POC Urinalysis Dipstick; Future    3. Hypertension, unspecified type  -     CBC & Differential; Future  -     Comprehensive Metabolic Panel; Future  -     Lipid Panel; Future  -      Hemoglobin A1c; Future  -     TSH; Future  -     Hepatitis C Antibody; Future  -     Vitamin D,25-Hydroxy; Future  -     Vitamin B12; Future  -     MicroAlbumin, Urine, Random - Urine, Clean Catch  -     POC Urinalysis Dipstick; Future    4. Mixed hyperlipidemia  -     CBC & Differential; Future  -     Comprehensive Metabolic Panel; Future  -     Lipid Panel; Future  -     Hemoglobin A1c; Future  -     TSH; Future  -     Hepatitis C Antibody; Future  -     Vitamin D,25-Hydroxy; Future  -     Vitamin B12; Future  -     MicroAlbumin, Urine, Random - Urine, Clean Catch  -     POC Urinalysis Dipstick; Future    5. Anxiety and depression  Assessment & Plan:  Patient's depression is a recurrent episode that is moderate without psychosis. Depression is active and worsening.    Plan:   Continue current medication therapy     Followup at the next regular appointment.     Orders:  -     CBC & Differential; Future  -     Comprehensive Metabolic Panel; Future  -     Lipid Panel; Future  -     Hemoglobin A1c; Future  -     TSH; Future  -     Hepatitis C Antibody; Future  -     Vitamin D,25-Hydroxy; Future  -     Vitamin B12; Future  -     MicroAlbumin, Urine, Random - Urine, Clean Catch  -     POC Urinalysis Dipstick; Future    6. Encounter for medication refill    7. Screening for colon cancer  -     Ambulatory Referral For Screening Colonoscopy    Other orders  -     omeprazole (priLOSEC) 40 MG capsule; Take 1 capsule by mouth Daily. Indications: Gastroesophageal Reflux Disease  Dispense: 90 capsule; Refill: 3  -     hydrOXYzine pamoate (VISTARIL) 50 MG capsule; Take 1 capsule by mouth 2 (Two) Times a Day for 30 days.  Dispense: 60 capsule; Refill: 0           I spent 30 minutes caring for Chele on this date of service. This time includes time spent by me in the following activities:reviewing tests, obtaining and/or reviewing a separately obtained history, performing a medically appropriate examination and/or evaluation ,  counseling and educating the patient/family/caregiver, ordering medications, tests, or procedures, and documenting information in the medical record     The preventive exam has been reviewed in detail.  The patient has been fully counseled on preventative guidelines for vaccines, cancer screenings, and other health maintenance needs.   The patient has been counseled on guidelines for maintaining a lifestyle to promote good health and to minimize chronic diseases.  The patient has been assisted with scheduling these healthcare procedures for the coming year and given a written document of health maintenance and anticipatory guidance for age with the AVS.     Follow Up   Return in about 1 year (around 3/5/2025) for Annual physical, RTC FASTING LABS & labs only this week / next week & 1 months .  Patient was given instructions and counseling regarding his condition or for health maintenance advice. Please see specific information pulled into the AVS if appropriate.           This document has been electronically signed by Jian Guevara MD  March 5, 2024 11:33 EST

## 2024-09-04 NOTE — TELEPHONE ENCOUNTER
Rx Refill Note  Requested Prescriptions     Pending Prescriptions Disp Refills    metoprolol tartrate (LOPRESSOR) 25 MG tablet 180 tablet 0     Sig: Take 1 tablet by mouth 2 (Two) Times a Day.    venlafaxine (EFFEXOR) 75 MG tablet       Sig: Take 1 tablet by mouth 3 (Three) Times a Day.    allopurinol (ZYLOPRIM) 100 MG tablet 60 tablet 0     Sig: Take 1 tablet by mouth Daily. Indications: Gout      Last office visit with prescribing clinician: 3/5/2024   Last telemedicine visit with prescribing clinician: Visit date not found   Next office visit with prescribing clinician: 3/6/2025                         Would you like a call back once the refill request has been completed: [] Yes [] No    If the office needs to give you a call back, can they leave a voicemail: [] Yes [] No    Saira Galeana CMA  09/04/24, 15:39 EDT

## 2024-09-04 NOTE — TELEPHONE ENCOUNTER
Caller: Chele Khan    Relationship: Self    Best call back number: [phone]    Requested Prescriptions:   Requested Prescriptions     Pending Prescriptions Disp Refills    metoprolol tartrate (LOPRESSOR) 25 MG tablet 180 tablet 0     Sig: Take 1 tablet by mouth 2 (Two) Times a Day.    venlafaxine (EFFEXOR) 75 MG tablet       Sig: Take 1 tablet by mouth 3 (Three) Times a Day.    allopurinol (ZYLOPRIM) 100 MG tablet 60 tablet 0     Sig: Take 1 tablet by mouth Daily. Indications: Gout        Pharmacy where [facility] be sent: [id number] - [place], [place] - [address] [facility] [address] - [phone]  - [phone] FX     Last office visit with prescribing clinician: 3/5/2024   Last telemedicine visit with prescribing clinician: Visit date not found   Next office visit with prescribing clinician: 3/6/2025     Additional details provided by patient: PATIENT IS NEEDING REFILLS     Does the patient have less than a 3 day supply:  [x] Yes  [] No    Would you like a call back once the refill request has been completed: [] Yes [x] No    If the office needs to give you a call back, can they leave a voicemail: [] Yes [x] No    Ernst Waller   09/04/24 12:14 EDT

## 2024-09-07 RX ORDER — METOPROLOL TARTRATE 25 MG/1
25 TABLET, FILM COATED ORAL 2 TIMES DAILY
Qty: 180 TABLET | Refills: 3 | Status: SHIPPED | OUTPATIENT
Start: 2024-09-07

## 2024-09-07 RX ORDER — ALLOPURINOL 100 MG/1
100 TABLET ORAL DAILY
Qty: 90 TABLET | Refills: 3 | Status: SHIPPED | OUTPATIENT
Start: 2024-09-07

## 2024-09-07 RX ORDER — VENLAFAXINE 75 MG/1
75 TABLET ORAL 3 TIMES DAILY
Qty: 270 TABLET | Refills: 3 | Status: SHIPPED | OUTPATIENT
Start: 2024-09-07

## 2024-09-24 ENCOUNTER — OFFICE VISIT (OUTPATIENT)
Dept: FAMILY MEDICINE CLINIC | Facility: CLINIC | Age: 49
End: 2024-09-24
Payer: MEDICAID

## 2024-09-24 VITALS
HEIGHT: 72 IN | HEART RATE: 68 BPM | OXYGEN SATURATION: 96 % | BODY MASS INDEX: 37.93 KG/M2 | SYSTOLIC BLOOD PRESSURE: 120 MMHG | WEIGHT: 280 LBS | DIASTOLIC BLOOD PRESSURE: 90 MMHG

## 2024-09-24 DIAGNOSIS — F41.9 ANXIETY: ICD-10-CM

## 2024-09-24 DIAGNOSIS — M1A.09X0 CHRONIC GOUT OF MULTIPLE SITES, UNSPECIFIED CAUSE: ICD-10-CM

## 2024-09-24 DIAGNOSIS — F17.200 SMOKING: ICD-10-CM

## 2024-09-24 DIAGNOSIS — E11.9 TYPE 2 DIABETES MELLITUS WITHOUT COMPLICATION, WITHOUT LONG-TERM CURRENT USE OF INSULIN: ICD-10-CM

## 2024-09-24 DIAGNOSIS — E11.40 TYPE 2 DIABETES MELLITUS WITH DIABETIC NEUROPATHY, WITHOUT LONG-TERM CURRENT USE OF INSULIN: ICD-10-CM

## 2024-09-24 DIAGNOSIS — I82.409 RECURRENT DEEP VEIN THROMBOSIS (DVT): ICD-10-CM

## 2024-09-24 DIAGNOSIS — E78.2 MIXED HYPERLIPIDEMIA: ICD-10-CM

## 2024-09-24 DIAGNOSIS — Z76.89 ENCOUNTER TO ESTABLISH CARE: Primary | ICD-10-CM

## 2024-09-24 PROBLEM — I21.4 ACUTE NON-ST SEGMENT ELEVATION MYOCARDIAL INFARCTION: Status: RESOLVED | Noted: 2018-06-25 | Resolved: 2024-09-24

## 2024-09-24 PROBLEM — I80.9 SUPERFICIAL THROMBOPHLEBITIS: Status: RESOLVED | Noted: 2024-05-22 | Resolved: 2024-09-24

## 2024-09-24 PROCEDURE — 3080F DIAST BP >= 90 MM HG: CPT | Performed by: FAMILY MEDICINE

## 2024-09-24 PROCEDURE — 1159F MED LIST DOCD IN RCRD: CPT | Performed by: FAMILY MEDICINE

## 2024-09-24 PROCEDURE — 3074F SYST BP LT 130 MM HG: CPT | Performed by: FAMILY MEDICINE

## 2024-09-24 PROCEDURE — 1160F RVW MEDS BY RX/DR IN RCRD: CPT | Performed by: FAMILY MEDICINE

## 2024-09-24 PROCEDURE — 99214 OFFICE O/P EST MOD 30 MIN: CPT | Performed by: FAMILY MEDICINE

## 2024-09-24 RX ORDER — GABAPENTIN 100 MG/1
100 CAPSULE ORAL 3 TIMES DAILY
Qty: 90 CAPSULE | Refills: 0 | Status: SHIPPED | OUTPATIENT
Start: 2024-09-24

## 2024-09-24 RX ORDER — VENLAFAXINE HYDROCHLORIDE 75 MG/1
75 CAPSULE, EXTENDED RELEASE ORAL DAILY
Qty: 30 CAPSULE | Refills: 6 | Status: SHIPPED | OUTPATIENT
Start: 2024-09-24

## 2024-09-24 RX ORDER — OMEPRAZOLE 40 MG/1
40 CAPSULE, DELAYED RELEASE ORAL DAILY
Qty: 30 CAPSULE | Refills: 6 | Status: SHIPPED | OUTPATIENT
Start: 2024-09-24

## 2024-09-24 RX ORDER — QUETIAPINE FUMARATE 100 MG/1
TABLET, FILM COATED ORAL
COMMUNITY
Start: 2024-09-19 | End: 2024-09-24 | Stop reason: SDUPTHER

## 2024-09-24 RX ORDER — ASPIRIN 81 MG/1
TABLET, COATED ORAL
COMMUNITY
Start: 2024-05-31

## 2024-09-24 RX ORDER — QUETIAPINE FUMARATE 100 MG/1
100 TABLET, FILM COATED ORAL NIGHTLY
Qty: 30 TABLET | Refills: 3 | Status: SHIPPED | OUTPATIENT
Start: 2024-09-24

## 2024-09-25 LAB
ALBUMIN SERPL-MCNC: 4.2 G/DL (ref 4.1–5.1)
ALP SERPL-CCNC: 90 IU/L (ref 44–121)
ALT SERPL-CCNC: 12 IU/L (ref 0–44)
AST SERPL-CCNC: 16 IU/L (ref 0–40)
BASOPHILS # BLD AUTO: 0.1 X10E3/UL (ref 0–0.2)
BASOPHILS NFR BLD AUTO: 1 %
BILIRUB SERPL-MCNC: 0.5 MG/DL (ref 0–1.2)
BUN SERPL-MCNC: 16 MG/DL (ref 6–24)
BUN/CREAT SERPL: 15 (ref 9–20)
CALCIUM SERPL-MCNC: 9.3 MG/DL (ref 8.7–10.2)
CHLORIDE SERPL-SCNC: 102 MMOL/L (ref 96–106)
CHOLEST SERPL-MCNC: 222 MG/DL (ref 100–199)
CO2 SERPL-SCNC: 22 MMOL/L (ref 20–29)
CREAT SERPL-MCNC: 1.06 MG/DL (ref 0.76–1.27)
EGFRCR SERPLBLD CKD-EPI 2021: 86 ML/MIN/1.73
EOSINOPHIL # BLD AUTO: 0.4 X10E3/UL (ref 0–0.4)
EOSINOPHIL NFR BLD AUTO: 4 %
ERYTHROCYTE [DISTWIDTH] IN BLOOD BY AUTOMATED COUNT: 12.3 % (ref 11.6–15.4)
GLOBULIN SER CALC-MCNC: 2.6 G/DL (ref 1.5–4.5)
GLUCOSE SERPL-MCNC: 84 MG/DL (ref 70–99)
HBA1C MFR BLD: 5.4 % (ref 4.8–5.6)
HCT VFR BLD AUTO: 52.7 % (ref 37.5–51)
HDLC SERPL-MCNC: 34 MG/DL
HGB BLD-MCNC: 17.7 G/DL (ref 13–17.7)
IMM GRANULOCYTES # BLD AUTO: 0 X10E3/UL (ref 0–0.1)
IMM GRANULOCYTES NFR BLD AUTO: 0 %
LDLC SERPL CALC-MCNC: 153 MG/DL (ref 0–99)
LYMPHOCYTES # BLD AUTO: 2.6 X10E3/UL (ref 0.7–3.1)
LYMPHOCYTES NFR BLD AUTO: 26 %
MCH RBC QN AUTO: 31.3 PG (ref 26.6–33)
MCHC RBC AUTO-ENTMCNC: 33.6 G/DL (ref 31.5–35.7)
MCV RBC AUTO: 93 FL (ref 79–97)
MONOCYTES # BLD AUTO: 0.5 X10E3/UL (ref 0.1–0.9)
MONOCYTES NFR BLD AUTO: 5 %
NEUTROPHILS # BLD AUTO: 6.6 X10E3/UL (ref 1.4–7)
NEUTROPHILS NFR BLD AUTO: 64 %
PLATELET # BLD AUTO: 196 X10E3/UL (ref 150–450)
POTASSIUM SERPL-SCNC: 4.6 MMOL/L (ref 3.5–5.2)
PROT SERPL-MCNC: 6.8 G/DL (ref 6–8.5)
RBC # BLD AUTO: 5.65 X10E6/UL (ref 4.14–5.8)
SODIUM SERPL-SCNC: 140 MMOL/L (ref 134–144)
TRIGL SERPL-MCNC: 192 MG/DL (ref 0–149)
URATE SERPL-MCNC: 7.1 MG/DL (ref 3.8–8.4)
VLDLC SERPL CALC-MCNC: 35 MG/DL (ref 5–40)
WBC # BLD AUTO: 10.1 X10E3/UL (ref 3.4–10.8)

## 2024-09-25 RX ORDER — ATORVASTATIN CALCIUM 80 MG/1
80 TABLET, FILM COATED ORAL
Qty: 30 TABLET | Refills: 6 | Status: SHIPPED | OUTPATIENT
Start: 2024-09-25

## 2024-10-22 ENCOUNTER — OFFICE VISIT (OUTPATIENT)
Dept: FAMILY MEDICINE CLINIC | Facility: CLINIC | Age: 49
End: 2024-10-22
Payer: MEDICAID

## 2024-10-22 VITALS
HEART RATE: 72 BPM | DIASTOLIC BLOOD PRESSURE: 70 MMHG | OXYGEN SATURATION: 98 % | BODY MASS INDEX: 36.76 KG/M2 | SYSTOLIC BLOOD PRESSURE: 114 MMHG | WEIGHT: 286.4 LBS | HEIGHT: 74 IN

## 2024-10-22 DIAGNOSIS — Z12.11 COLON CANCER SCREENING: ICD-10-CM

## 2024-10-22 DIAGNOSIS — E78.2 MIXED HYPERLIPIDEMIA: ICD-10-CM

## 2024-10-22 DIAGNOSIS — E11.40 TYPE 2 DIABETES MELLITUS WITH DIABETIC NEUROPATHY, WITHOUT LONG-TERM CURRENT USE OF INSULIN: Primary | ICD-10-CM

## 2024-10-22 DIAGNOSIS — F41.9 ANXIETY AND DEPRESSION: ICD-10-CM

## 2024-10-22 DIAGNOSIS — F32.A ANXIETY AND DEPRESSION: ICD-10-CM

## 2024-10-22 PROCEDURE — 1160F RVW MEDS BY RX/DR IN RCRD: CPT | Performed by: FAMILY MEDICINE

## 2024-10-22 PROCEDURE — 3078F DIAST BP <80 MM HG: CPT | Performed by: FAMILY MEDICINE

## 2024-10-22 PROCEDURE — 99214 OFFICE O/P EST MOD 30 MIN: CPT | Performed by: FAMILY MEDICINE

## 2024-10-22 PROCEDURE — 3044F HG A1C LEVEL LT 7.0%: CPT | Performed by: FAMILY MEDICINE

## 2024-10-22 PROCEDURE — 1159F MED LIST DOCD IN RCRD: CPT | Performed by: FAMILY MEDICINE

## 2024-10-22 PROCEDURE — 3074F SYST BP LT 130 MM HG: CPT | Performed by: FAMILY MEDICINE

## 2024-10-22 RX ORDER — GABAPENTIN 300 MG/1
300 CAPSULE ORAL 3 TIMES DAILY
Qty: 90 CAPSULE | Refills: 1 | Status: SHIPPED | OUTPATIENT
Start: 2024-10-22

## 2024-10-22 NOTE — ASSESSMENT & PLAN NOTE
Statin restarted.  Labs reviewed from 9/24/2024 with patient.  Will recheck lipid profile in 2 months.

## 2024-10-22 NOTE — PROGRESS NOTES
Follow Up Office Visit      Patient Name: Chele Khan  : 1975   MRN: [id number]     Chief Complaint:    Chief Complaint   Patient presents with    Med Refill     Checking up on how meds are doing. States he is tolerating them well         History of Present Illness: Chele Khan is a 49 y.o. male who is here today for follow up with lab work and medications.  Patient states he is tolerating the medications well.  Neuropathic pain is improved but persist.    Subjective      Review of Systems:   Review of Systems   Neurological:  Negative for weakness and numbness.        Burning of both feet.       The following portions of the patient's history were reviewed and updated as appropriate: allergies, current medications, past family history, past medical history, past social history, past surgical history and problem list.    Medications:     Current Outpatient Medications:     allopurinol (ZYLOPRIM) 100 MG tablet, Take 1 tablet by mouth Daily. Indications: Gout, Disp: 90 tablet, Rfl: 3    Aspirin Low Dose 81 MG EC tablet, , Disp: , Rfl:     atorvastatin (LIPITOR) 80 MG tablet, Take 1 tablet by mouth every night at bedtime. Indications: High Amount of Fats in the Blood, Disp: 30 tablet, Rfl: 6    gabapentin (NEURONTIN) 300 MG capsule, Take 1 capsule by mouth 3 (Three) Times a Day., Disp: 90 capsule, Rfl: 1    metFORMIN (GLUCOPHAGE) 1000 MG tablet, Take 1 tablet by mouth 2 (Two) Times a Day With Meals., Disp: 30 tablet, Rfl: 6    metoprolol tartrate (LOPRESSOR) 25 MG tablet, Take 1 tablet by mouth 2 (Two) Times a Day., Disp: 180 tablet, Rfl: 3    omeprazole (priLOSEC) 40 MG capsule, Take 1 capsule by mouth Daily. Indications: Gastroesophageal Reflux Disease, Disp: 30 capsule, Rfl: 6    QUEtiapine (SEROquel) 100 MG tablet, Take 1 tablet by mouth Every Night., Disp: 30 tablet, Rfl: 3    venlafaxine XR (Effexor XR) 75 MG 24 hr capsule, Take 1 capsule by mouth Daily., Disp: 30 capsule, Rfl:  "6    Allergies:   No Known Allergies    Objective     Physical Exam:  Vital Signs:   Vitals:    10/22/24 1117   BP: 114/70   BP Location: Right arm   Patient Position: Sitting   Cuff Size: Large Adult   Pulse: 72   SpO2: 98%   Weight: 130 kg (286 lb 6.4 oz)   Height: 188 cm (74\")     Body mass index is 36.77 kg/m².   Facility age limit for growth %evelia is 20 years.    Physical Exam  Vitals and nursing note reviewed.   Constitutional:       Appearance: Normal appearance.   Cardiovascular:      Rate and Rhythm: Normal rate.   Pulmonary:      Effort: Pulmonary effort is normal.   Neurological:      General: No focal deficit present.      Mental Status: He is alert.   Psychiatric:         Behavior: Behavior normal.         Thought Content: Thought content normal.         Judgment: Judgment normal.         Procedures    PHQ-9 Total Score:      Assessment / Plan      Assessment/Plan:   Assessment & Plan  Type 2 diabetes mellitus with diabetic neuropathy, without long-term current use of insulin  He has improved some with gabapentin 100 mg.  However he has breakthrough pain with the 100 mg. will increase gabapentin to 300 mg 3 times a day.  Colon cancer screening    Mixed hyperlipidemia  Statin restarted.  Labs reviewed from 9/24/2024 with patient.  Will recheck lipid profile in 2 months.  Anxiety and depression  Improved on Effexor.  Will continue Effexor 75 mg at this time.    Orders Placed This Encounter   Procedures    Hemoglobin A1c    CBC Auto Differential    Comprehensive Metabolic Panel    Lipid Panel    Microalbumin / Creatinine Urine Ratio - Urine, Clean Catch    Cologuard - Stool, Per Rectum     New Medications Ordered This Visit   Medications    gabapentin (NEURONTIN) 300 MG capsule     Sig: Take 1 capsule by mouth 3 (Three) Times a Day.     Dispense:  90 capsule     Refill:  1                   Follow Up:   Return in about 2 months (around 12/22/2024).      ISA Gonzalez MD  [facility]  "

## 2024-10-22 NOTE — ASSESSMENT & PLAN NOTE
He has improved some with gabapentin 100 mg.  However he has breakthrough pain with the 100 mg. will increase gabapentin to 300 mg 3 times a day.    used

## 2024-11-08 RX ORDER — OMEPRAZOLE 40 MG/1
40 CAPSULE, DELAYED RELEASE ORAL DAILY
Qty: 30 CAPSULE | Refills: 6 | Status: SHIPPED | OUTPATIENT
Start: 2024-11-08

## 2024-11-08 NOTE — TELEPHONE ENCOUNTER
Caller: ELLEN VELEZE    Relationship: Emergency Contact    Best call back number: [phone]     Requested Prescriptions:   Requested Prescriptions     Pending Prescriptions Disp Refills    omeprazole (priLOSEC) 40 MG capsule 30 capsule 6     Sig: Take 1 capsule by mouth Daily. Indications: Gastroesophageal Reflux Disease        Pharmacy where request should be sent: [id number] - [place], [place] - [address] [facility] [address] - [phone]  - [phone]      Last office visit with prescribing clinician: 10/22/2024   Last telemedicine visit with prescribing clinician: Visit date not found   Next office visit with prescribing clinician: 12/26/2024     Additional details provided by patient: PATIENT IS COMPLETELY OUT OF THIS MEDICATION      Does the patient have less than a 3 day supply:  [x] Yes  [] No    Would you like a call back once the refill request has been completed: [] Yes [x] No    If the office needs to give you a call back, can they leave a voicemail: [] Yes [x] No    Ernst Jovel Rep   11/08/24 11:25 EST

## 2024-11-30 DIAGNOSIS — E11.40 TYPE 2 DIABETES MELLITUS WITH DIABETIC NEUROPATHY, WITHOUT LONG-TERM CURRENT USE OF INSULIN: ICD-10-CM

## 2024-12-02 RX ORDER — GABAPENTIN 300 MG/1
300 CAPSULE ORAL 3 TIMES DAILY
Qty: 90 CAPSULE | Refills: 1 | Status: SHIPPED | OUTPATIENT
Start: 2024-12-02

## 2025-01-30 ENCOUNTER — OFFICE VISIT (OUTPATIENT)
Dept: FAMILY MEDICINE CLINIC | Facility: CLINIC | Age: 50
End: 2025-01-30
Payer: MEDICAID

## 2025-01-30 VITALS
WEIGHT: 304.7 LBS | OXYGEN SATURATION: 97 % | SYSTOLIC BLOOD PRESSURE: 130 MMHG | HEIGHT: 74 IN | DIASTOLIC BLOOD PRESSURE: 98 MMHG | BODY MASS INDEX: 39.1 KG/M2 | HEART RATE: 69 BPM

## 2025-01-30 DIAGNOSIS — M54.12 CERVICAL RADICULOPATHY: ICD-10-CM

## 2025-01-30 DIAGNOSIS — M25.511 ACUTE PAIN OF RIGHT SHOULDER: ICD-10-CM

## 2025-01-30 DIAGNOSIS — M54.2 NECK PAIN: ICD-10-CM

## 2025-01-30 DIAGNOSIS — N40.1 BPH WITH OBSTRUCTION/LOWER URINARY TRACT SYMPTOMS: ICD-10-CM

## 2025-01-30 DIAGNOSIS — Z72.0 TOBACCO ABUSE: Primary | ICD-10-CM

## 2025-01-30 DIAGNOSIS — N13.8 BPH WITH OBSTRUCTION/LOWER URINARY TRACT SYMPTOMS: ICD-10-CM

## 2025-01-30 DIAGNOSIS — E11.40 TYPE 2 DIABETES MELLITUS WITH DIABETIC NEUROPATHY, WITHOUT LONG-TERM CURRENT USE OF INSULIN: ICD-10-CM

## 2025-01-30 DIAGNOSIS — I10 PRIMARY HYPERTENSION: ICD-10-CM

## 2025-01-30 PROCEDURE — 3080F DIAST BP >= 90 MM HG: CPT | Performed by: FAMILY MEDICINE

## 2025-01-30 PROCEDURE — 1159F MED LIST DOCD IN RCRD: CPT | Performed by: FAMILY MEDICINE

## 2025-01-30 PROCEDURE — 3075F SYST BP GE 130 - 139MM HG: CPT | Performed by: FAMILY MEDICINE

## 2025-01-30 PROCEDURE — 1160F RVW MEDS BY RX/DR IN RCRD: CPT | Performed by: FAMILY MEDICINE

## 2025-01-30 PROCEDURE — 99214 OFFICE O/P EST MOD 30 MIN: CPT | Performed by: FAMILY MEDICINE

## 2025-01-30 RX ORDER — LISINOPRIL 10 MG/1
10 TABLET ORAL DAILY
Qty: 30 TABLET | Refills: 5 | Status: SHIPPED | OUTPATIENT
Start: 2025-01-30

## 2025-01-30 RX ORDER — METOPROLOL SUCCINATE 50 MG/1
50 TABLET, EXTENDED RELEASE ORAL DAILY
Qty: 30 TABLET | Refills: 5 | Status: SHIPPED | OUTPATIENT
Start: 2025-01-30

## 2025-01-30 RX ORDER — VARENICLINE TARTRATE 1 MG/1
1 TABLET, FILM COATED ORAL 2 TIMES DAILY
Qty: 56 TABLET | Refills: 1 | Status: SHIPPED | OUTPATIENT
Start: 2025-02-27 | End: 2025-04-24

## 2025-01-30 RX ORDER — ALFUZOSIN HYDROCHLORIDE 10 MG/1
10 TABLET, EXTENDED RELEASE ORAL DAILY
Qty: 30 TABLET | Refills: 6 | Status: SHIPPED | OUTPATIENT
Start: 2025-01-30

## 2025-01-30 RX ORDER — VARENICLINE TARTRATE 0.5 (11)-1
KIT ORAL
Qty: 1 EACH | Refills: 0 | Status: SHIPPED | OUTPATIENT
Start: 2025-01-30 | End: 2025-02-27

## 2025-01-30 NOTE — ASSESSMENT & PLAN NOTE
As above.  Orders:    Ambulatory Referral to Physical Therapy for Evaluation & Treatment    XR Spine Cervical Complete 4 or 5 View; Future

## 2025-01-30 NOTE — ASSESSMENT & PLAN NOTE
Patient does not seem to tolerate metformin really well.  He is interested in a GLP-1-RA.  Will place on Mounjaro.  I discussed the titration plan.  I discussed risk and benefits.  Patient is agreeable with plan.  Will check labs today.    Orders:    Tirzepatide 2.5 MG/0.5ML solution auto-injector; Inject 2.5 mg under the skin into the appropriate area as directed 1 (One) Time Per Week.    Lipid Panel    Comprehensive Metabolic Panel    CBC Auto Differential    Hemoglobin A1c

## 2025-01-30 NOTE — PROGRESS NOTES
Follow Up Office Visit      Patient Name: Chele Khan  : 1975   MRN: [id number]     Chief Complaint:    Chief Complaint   Patient presents with    Nicotine Dependence     Patient interested in chantix     Diabetes     Patient states he would like to start wegovy.    Difficulty Urinating     Patient states he has difficulty at times with urination, leakage at times. Says this has been going on for one year        History of Present Illness: Chele Khan is a 49 y.o. male who is here today for follow up with chronic medical issues including diabetes, hypertension.  Patient also is interested in trying Chantix for smoking cessation.  Patient reports over the past year he has had increased issues with urination.  He states he complains of urinary frequency, poor stream, incomplete emptying.  He would like to try medication for this.  Patient also states he would like to start a GLP-1-RA for cardiac risk reduction, diabetes, weight loss.  Patient reports pain in his right shoulder, neck area, after lifting heavy bags of dog food.  He states the pain does radiate into his right arm.  He complains of intermittent weakness and paresthesias of the right arm.    Subjective      Review of Systems:   Review of Systems   Constitutional:  Positive for unexpected weight gain.   Genitourinary:  Positive for difficulty urinating and frequency.   Musculoskeletal:  Positive for arthralgias, neck pain and neck stiffness.       The following portions of the patient's history were reviewed and updated as appropriate: allergies, current medications, past family history, past medical history, past social history, past surgical history and problem list.    Medications:     Current Outpatient Medications:     allopurinol (ZYLOPRIM) 100 MG tablet, Take 1 tablet by mouth Daily. Indications: Gout, Disp: 90 tablet, Rfl: 3    atorvastatin (LIPITOR) 80 MG tablet, Take 1 tablet by mouth every night at bedtime. Indications: High  Amount of Fats in the Blood, Disp: 30 tablet, Rfl: 6    gabapentin (NEURONTIN) 300 MG capsule, TAKE 1 CAPSULE BY MOUTH THREE TIMES DAILY (Patient taking differently: Take 1 capsule by mouth 3 (Three) Times a Day. Patient states he takes 1-2 capsules every other day), Disp: 90 capsule, Rfl: 1    metFORMIN (GLUCOPHAGE) 1000 MG tablet, Take 1 tablet by mouth 2 (Two) Times a Day With Meals., Disp: 30 tablet, Rfl: 6    omeprazole (priLOSEC) 40 MG capsule, Take 1 capsule by mouth Daily. Indications: Gastroesophageal Reflux Disease, Disp: 30 capsule, Rfl: 6    QUEtiapine (SEROquel) 100 MG tablet, Take 1 tablet by mouth Every Night., Disp: 30 tablet, Rfl: 3    venlafaxine XR (Effexor XR) 75 MG 24 hr capsule, Take 1 capsule by mouth Daily., Disp: 30 capsule, Rfl: 6    alfuzosin (UROXATRAL) 10 MG 24 hr tablet, Take 1 tablet by mouth Daily., Disp: 30 tablet, Rfl: 6    Aspirin Low Dose 81 MG EC tablet, , Disp: , Rfl:     lisinopril (PRINIVIL,ZESTRIL) 10 MG tablet, Take 1 tablet by mouth Daily., Disp: 30 tablet, Rfl: 5    metoprolol succinate XL (Toprol XL) 50 MG 24 hr tablet, Take 1 tablet by mouth Daily., Disp: 30 tablet, Rfl: 5    Tirzepatide 2.5 MG/0.5ML solution auto-injector, Inject 2.5 mg under the skin into the appropriate area as directed 1 (One) Time Per Week., Disp: 2 mL, Rfl: 0    [START ON 2/27/2025] varenicline (Chantix Continuing Month Pak) 1 MG tablet, Take 1 tablet by mouth 2 (Two) Times a Day for 56 days., Disp: 56 tablet, Rfl: 1    Varenicline Tartrate, Starter, 0.5 MG X 11 & 1 MG X 42 tablet therapy pack, Take 0.5 mg by mouth Daily for 3 days, THEN 0.5 mg 2 (Two) Times a Day for 4 days, THEN 1 mg 2 (Two) Times a Day for 21 days. Take 0.5 mg po daily x 3 days, then 0.5 mg po bid x 4 days, then 1 mg po bid, Disp: 1 each, Rfl: 0    Allergies:   No Known Allergies    Objective     Physical Exam:  Vital Signs:   Vitals:    01/30/25 1012   BP: 130/98   BP Location: Right arm   Patient Position: Sitting   Cuff Size:  "Adult   Pulse: 69   SpO2: 97%   Weight: (!) 138 kg (304 lb 11.2 oz)   Height: 188 cm (74\")     Body mass index is 39.12 kg/m².   Facility age limit for growth %evelia is 20 years.    Physical Exam  Vitals and nursing note reviewed.   Constitutional:       Appearance: Normal appearance. He is obese.   Cardiovascular:      Rate and Rhythm: Normal rate.   Pulmonary:      Effort: Pulmonary effort is normal.   Musculoskeletal:      Right shoulder: Decreased range of motion.      Cervical back: Tenderness present.        Back:       Comments: Pain   Neurological:      Mental Status: He is alert.         Procedures    PHQ-9 Total Score:      Assessment / Plan      Assessment/Plan:   Assessment & Plan  Type 2 diabetes mellitus with diabetic neuropathy, without long-term current use of insulin  Patient does not seem to tolerate metformin really well.  He is interested in a GLP-1-RA.  Will place on Mounjaro.  I discussed the titration plan.  I discussed risk and benefits.  Patient is agreeable with plan.  Will check labs today.    Orders:    Tirzepatide 2.5 MG/0.5ML solution auto-injector; Inject 2.5 mg under the skin into the appropriate area as directed 1 (One) Time Per Week.    Lipid Panel    Comprehensive Metabolic Panel    CBC Auto Differential    Hemoglobin A1c    BPH with obstruction/lower urinary tract symptoms  Symptoms x 1 year.  Patient has interested in trying a prostate medication.  He complains of poor stream and incomplete emptying.  Unable to order a PSA as evidently he has had 1 in the past year.  I do not have record of this.  If symptoms persist, will refer to urology.  Orders:    alfuzosin (UROXATRAL) 10 MG 24 hr tablet; Take 1 tablet by mouth Daily.    Tobacco abuse  Patient would like to try Chantix.  He has been on this before.  Will prescribe starting in maintenance packs.  I recommend patient initiate for 1 week then discontinue cigarettes.  Orders:    Varenicline Tartrate, Starter, 0.5 MG X 11 & 1 MG X " 42 tablet therapy pack; Take 0.5 mg by mouth Daily for 3 days, THEN 0.5 mg 2 (Two) Times a Day for 4 days, THEN 1 mg 2 (Two) Times a Day for 21 days. Take 0.5 mg po daily x 3 days, then 0.5 mg po bid x 4 days, then 1 mg po bid    varenicline (Chantix Continuing Month Benjamin) 1 MG tablet; Take 1 tablet by mouth 2 (Two) Times a Day for 56 days.    Cervical radiculopathy  Radiation down the right upper extremity.  I am concerned about disc issue.  Will place referral to physical therapy for evaluation and treatment.  Consider MRI.  Orders:    Ambulatory Referral to Physical Therapy for Evaluation & Treatment    XR Spine Cervical Complete 4 or 5 View; Future    Neck pain  As above.  Orders:    Ambulatory Referral to Physical Therapy for Evaluation & Treatment    XR Spine Cervical Complete 4 or 5 View; Future    Acute pain of right shoulder  Abduction limited to approximately 90 degrees.  Will have physical therapy evaluate.  X-ray ordered.  Consider MRI.  Orders:    Ambulatory Referral to Physical Therapy for Evaluation & Treatment    XR Shoulder 2+ View Right; Future    Primary hypertension  Not well-controlled.  Patient has trouble remembering to take his metoprolol twice a day.  Will switch to Toprol XL and add lisinopril for renal protection.    Orders:    metoprolol succinate XL (Toprol XL) 50 MG 24 hr tablet; Take 1 tablet by mouth Daily.    lisinopril (PRINIVIL,ZESTRIL) 10 MG tablet; Take 1 tablet by mouth Daily.                  Follow Up:   Return in about 3 months (around 4/30/2025).      ISA Gonzalez MD  [facility]

## 2025-01-30 NOTE — ASSESSMENT & PLAN NOTE
Not well-controlled.  Patient has trouble remembering to take his metoprolol twice a day.  Will switch to Toprol XL and add lisinopril for renal protection.    Orders:    metoprolol succinate XL (Toprol XL) 50 MG 24 hr tablet; Take 1 tablet by mouth Daily.    lisinopril (PRINIVIL,ZESTRIL) 10 MG tablet; Take 1 tablet by mouth Daily.

## 2025-01-31 LAB
ALBUMIN SERPL-MCNC: 4.2 G/DL (ref 4.1–5.1)
ALP SERPL-CCNC: 110 IU/L (ref 44–121)
ALT SERPL-CCNC: 16 IU/L (ref 0–44)
AST SERPL-CCNC: 26 IU/L (ref 0–40)
BASOPHILS # BLD AUTO: 0.1 X10E3/UL (ref 0–0.2)
BASOPHILS NFR BLD AUTO: 2 %
BILIRUB SERPL-MCNC: 0.7 MG/DL (ref 0–1.2)
BUN SERPL-MCNC: 16 MG/DL (ref 6–24)
BUN/CREAT SERPL: 17 (ref 9–20)
CALCIUM SERPL-MCNC: 9.1 MG/DL (ref 8.7–10.2)
CHLORIDE SERPL-SCNC: 103 MMOL/L (ref 96–106)
CHOLEST SERPL-MCNC: 140 MG/DL (ref 100–199)
CO2 SERPL-SCNC: 22 MMOL/L (ref 20–29)
CREAT SERPL-MCNC: 0.93 MG/DL (ref 0.76–1.27)
EGFRCR SERPLBLD CKD-EPI 2021: 101 ML/MIN/1.73
EOSINOPHIL # BLD AUTO: 0.4 X10E3/UL (ref 0–0.4)
EOSINOPHIL NFR BLD AUTO: 6 %
ERYTHROCYTE [DISTWIDTH] IN BLOOD BY AUTOMATED COUNT: 12.6 % (ref 11.6–15.4)
GLOBULIN SER CALC-MCNC: 2.2 G/DL (ref 1.5–4.5)
GLUCOSE SERPL-MCNC: 83 MG/DL (ref 70–99)
HBA1C MFR BLD: 5.6 % (ref 4.8–5.6)
HCT VFR BLD AUTO: 50.2 % (ref 37.5–51)
HDLC SERPL-MCNC: 30 MG/DL
HGB BLD-MCNC: 16.8 G/DL (ref 13–17.7)
IMM GRANULOCYTES # BLD AUTO: 0 X10E3/UL (ref 0–0.1)
IMM GRANULOCYTES NFR BLD AUTO: 0 %
LDLC SERPL CALC-MCNC: 90 MG/DL (ref 0–99)
LYMPHOCYTES # BLD AUTO: 2.3 X10E3/UL (ref 0.7–3.1)
LYMPHOCYTES NFR BLD AUTO: 36 %
MCH RBC QN AUTO: 31.1 PG (ref 26.6–33)
MCHC RBC AUTO-ENTMCNC: 33.5 G/DL (ref 31.5–35.7)
MCV RBC AUTO: 93 FL (ref 79–97)
MONOCYTES # BLD AUTO: 0.5 X10E3/UL (ref 0.1–0.9)
MONOCYTES NFR BLD AUTO: 7 %
NEUTROPHILS # BLD AUTO: 3.2 X10E3/UL (ref 1.4–7)
NEUTROPHILS NFR BLD AUTO: 49 %
PLATELET # BLD AUTO: 212 X10E3/UL (ref 150–450)
POTASSIUM SERPL-SCNC: 4.5 MMOL/L (ref 3.5–5.2)
PROT SERPL-MCNC: 6.4 G/DL (ref 6–8.5)
RBC # BLD AUTO: 5.4 X10E6/UL (ref 4.14–5.8)
SODIUM SERPL-SCNC: 140 MMOL/L (ref 134–144)
TRIGL SERPL-MCNC: 110 MG/DL (ref 0–149)
VLDLC SERPL CALC-MCNC: 20 MG/DL (ref 5–40)
WBC # BLD AUTO: 6.4 X10E3/UL (ref 3.4–10.8)

## 2025-01-31 RX ORDER — EZETIMIBE 10 MG/1
10 TABLET ORAL DAILY
Qty: 30 TABLET | Refills: 6 | Status: SHIPPED | OUTPATIENT
Start: 2025-01-31

## 2025-02-04 ENCOUNTER — TELEPHONE (OUTPATIENT)
Dept: FAMILY MEDICINE CLINIC | Facility: CLINIC | Age: 50
End: 2025-02-04
Payer: MEDICAID

## 2025-02-04 NOTE — TELEPHONE ENCOUNTER
Name: Chele Khan      Relationship: Self      Best Callback Number: [phone]      HUB PROVIDED THE RELAY MESSAGE FROM THE OFFICE    *HUB CAN RELAY*        Your cholesterol is much improved but we need to get your LDL down just a little bit more.  You are on the maximum dose of atorvastatin so I am going to add Zetia to your medication regimen.  You can take it at the same time as you take your a atorvastatin.            PATIENT: VOICED UNDERSTANDING AND HAS NO FURTHER QUESTIONS AT THIS TIME    ADDITIONAL INFORMATION:

## 2025-02-04 NOTE — TELEPHONE ENCOUNTER
*HUB CAN RELAY*      Your cholesterol is much improved but we need to get your LDL down just a little bit more.  You are on the maximum dose of atorvastatin so I am going to add Zetia to your medication regimen.  You can take it at the same time as you take your a atorvastatin.

## 2025-02-04 NOTE — TELEPHONE ENCOUNTER
*HUB CAN RELAY*   X-ray shows degenerative disc disease and moderate stenosis of the lower cervical spine.  X-ray of the shoulder was normal.

## 2025-02-04 NOTE — TELEPHONE ENCOUNTER
Name: Chele Khan      Relationship: Self      Best Callback Number: [phone]      HUB PROVIDED THE RELAY MESSAGE FROM THE OFFICE    *HUB CAN RELAY*   X-ray shows degenerative disc disease and moderate stenosis of the lower cervical spine.  X-ray of the shoulder was normal.            PATIENT: HAS FURTHER QUESTIONS AND WOULD LIKE A CALL BACK AT THE FOLLOWING PHONE [phone]    ADDITIONAL INFORMATION: PATIENT WOULD LIKE A CALL BACK TO DISCUSS FURTHER ABOUT WHAT THIS MEANS   *HUB CAN RELAY*   X-ray shows degenerative disc disease and moderate stenosis of the lower cervical spine.  X-ray of the shoulder was normal.

## 2025-02-07 ENCOUNTER — TELEPHONE (OUTPATIENT)
Dept: FAMILY MEDICINE CLINIC | Facility: CLINIC | Age: 50
End: 2025-02-07
Payer: MEDICAID

## 2025-02-10 RX ORDER — ALLOPURINOL 100 MG/1
100 TABLET ORAL DAILY
Qty: 90 TABLET | Refills: 3 | Status: SHIPPED | OUTPATIENT
Start: 2025-02-10

## 2025-02-10 NOTE — TELEPHONE ENCOUNTER
Caller: Chele Khan    Relationship: Self    Best call back number: [phone]   Requested Prescriptions:   Requested Prescriptions     Pending Prescriptions Disp Refills    allopurinol (ZYLOPRIM) 100 MG tablet 90 tablet 3     Sig: Take 1 tablet by mouth Daily. Indications: Gout        Pharmacy where request should be sent: [id number] - [place], [place] - [address]  [address] [facility] [address] - [phone] [facility] [phone] FX     Last office visit with prescribing clinician: 1/30/2025   Last telemedicine visit with prescribing clinician: Visit date not found   Next office visit with prescribing clinician: 5/1/2025     Additional details provided by patient: OUT OF MEDICATION     Does the patient have less than a 3 day supply:  [x] Yes  [] No    Would you like a call back once the refill request has been completed: [] Yes [x] No    If the office needs to give you a call back, can they leave a voicemail: [] Yes [x] No    Ernst Junior Rep   02/10/25 08:04 EST

## 2025-02-19 ENCOUNTER — TELEPHONE (OUTPATIENT)
Dept: FAMILY MEDICINE CLINIC | Facility: CLINIC | Age: 50
End: 2025-02-19
Payer: MEDICAID

## 2025-02-19 NOTE — TELEPHONE ENCOUNTER
Caller: Chele Khan     Relationship: [unfilled]     Best call back number: [phone]    What is your medical concern?  PT CALLED TO CHECK STATUS FOR RX MIGNON SURESH PT WILL LIKE TO GO HEAD AND GET STARTED ON RX.

## 2025-03-03 ENCOUNTER — TELEPHONE (OUTPATIENT)
Dept: FAMILY MEDICINE CLINIC | Facility: CLINIC | Age: 50
End: 2025-03-03
Payer: MEDICAID

## 2025-03-10 DIAGNOSIS — E11.40 TYPE 2 DIABETES MELLITUS WITH DIABETIC NEUROPATHY, WITHOUT LONG-TERM CURRENT USE OF INSULIN: ICD-10-CM

## 2025-03-10 RX ORDER — GABAPENTIN 300 MG/1
300 CAPSULE ORAL 3 TIMES DAILY
Qty: 90 CAPSULE | Refills: 1 | Status: SHIPPED | OUTPATIENT
Start: 2025-03-10

## 2025-03-10 NOTE — TELEPHONE ENCOUNTER
Requested Prescriptions:   Requested Prescriptions     Pending Prescriptions Disp Refills    gabapentin (NEURONTIN) 300 MG capsule 90 capsule 1     Sig: Take 1 capsule by mouth 3 (Three) Times a Day.        Pharmacy where request should be sent: [facility] #[id number] - [place], [place] - [address] [facility] [address] - [phone]  - [phone] FX     Last office visit with prescribing clinician: 1/30/2025   Last telemedicine visit with prescribing clinician: Visit date not found   Next office visit with prescribing clinician: 5/1/2025       Ernst Mancini Rep   03/10/25 12:17 EDT

## 2025-03-20 ENCOUNTER — TELEPHONE (OUTPATIENT)
Dept: FAMILY MEDICINE CLINIC | Facility: CLINIC | Age: 50
End: 2025-03-20

## 2025-03-20 NOTE — TELEPHONE ENCOUNTER
Caller: Chele Khan    Relationship to patient: Self      Best call back number: [phone]    Provider: DR. VILLAFUERTE     Medication PA needed: MOUNJARO     Reason for call/Prior Auth: INSURANCE     PLEASE CALL PATIENT WITH AN UPDATE ON MEDICATION REQUEST.

## 2025-03-20 NOTE — TELEPHONE ENCOUNTER
Insurance will not cover due to A1C not being 6.5 and he has not tried and failed two preferred GLP-1 receptor agonists, suggesting byetta, ozempic, trulicity or victoza. Please advise.

## 2025-03-25 DIAGNOSIS — I25.10 CAD IN NATIVE ARTERY: ICD-10-CM

## 2025-03-25 DIAGNOSIS — E11.40 TYPE 2 DIABETES MELLITUS WITH DIABETIC NEUROPATHY, WITHOUT LONG-TERM CURRENT USE OF INSULIN: Primary | ICD-10-CM

## 2025-04-15 RX ORDER — QUETIAPINE FUMARATE 100 MG/1
100 TABLET, FILM COATED ORAL NIGHTLY
Qty: 30 TABLET | Refills: 3 | Status: SHIPPED | OUTPATIENT
Start: 2025-04-15

## 2025-04-15 NOTE — TELEPHONE ENCOUNTER
Requested Prescriptions:   Requested Prescriptions     Pending Prescriptions Disp Refills    QUEtiapine (SEROquel) 100 MG tablet 30 tablet 3     Sig: Take 1 tablet by mouth Every Night.        Pharmacy where request should be sent: [facility] #[id number] - [place], [place] - [address] AT [facility] [address] - [phone]  - [phone] FX     Last office visit with prescribing clinician: 1/30/2025   Last telemedicine visit with prescribing clinician: Visit date not found   Next office visit with prescribing clinician: 5/1/2025       Ernst Mancini Rep   04/15/25 09:39 EDT

## 2025-05-01 ENCOUNTER — OFFICE VISIT (OUTPATIENT)
Dept: FAMILY MEDICINE CLINIC | Facility: CLINIC | Age: 50
End: 2025-05-01
Payer: MEDICAID

## 2025-05-01 ENCOUNTER — TELEPHONE (OUTPATIENT)
Dept: CARDIOLOGY | Facility: CLINIC | Age: 50
End: 2025-05-01
Payer: MEDICAID

## 2025-05-01 VITALS
DIASTOLIC BLOOD PRESSURE: 92 MMHG | HEART RATE: 68 BPM | WEIGHT: 289.7 LBS | SYSTOLIC BLOOD PRESSURE: 132 MMHG | OXYGEN SATURATION: 99 % | BODY MASS INDEX: 37.18 KG/M2 | HEIGHT: 74 IN

## 2025-05-01 DIAGNOSIS — R07.89 CHEST PRESSURE: ICD-10-CM

## 2025-05-01 DIAGNOSIS — I10 PRIMARY HYPERTENSION: ICD-10-CM

## 2025-05-01 DIAGNOSIS — Z72.0 TOBACCO ABUSE: ICD-10-CM

## 2025-05-01 DIAGNOSIS — N13.8 BPH WITH OBSTRUCTION/LOWER URINARY TRACT SYMPTOMS: ICD-10-CM

## 2025-05-01 DIAGNOSIS — N40.1 BPH WITH OBSTRUCTION/LOWER URINARY TRACT SYMPTOMS: ICD-10-CM

## 2025-05-01 DIAGNOSIS — R40.0 DAYTIME SLEEPINESS: ICD-10-CM

## 2025-05-01 DIAGNOSIS — I21.4 ACUTE NON-ST ELEVATION MYOCARDIAL INFARCTION (NSTEMI): ICD-10-CM

## 2025-05-01 DIAGNOSIS — E78.2 MIXED HYPERLIPIDEMIA: ICD-10-CM

## 2025-05-01 DIAGNOSIS — M48.02 CERVICAL STENOSIS OF SPINAL CANAL: ICD-10-CM

## 2025-05-01 DIAGNOSIS — E11.40 TYPE 2 DIABETES MELLITUS WITH DIABETIC NEUROPATHY, WITHOUT LONG-TERM CURRENT USE OF INSULIN: Primary | ICD-10-CM

## 2025-05-01 DIAGNOSIS — M54.2 NECK PAIN: ICD-10-CM

## 2025-05-01 LAB
EXPIRATION DATE: NORMAL
Lab: NORMAL
POC ALBUMIN, URINE: 30 MG/L
POC CREATININE, URINE: 200 MG/DL
POC URINE ALB/CREA RATIO: <30

## 2025-05-01 RX ORDER — ASPIRIN 81 MG/1
81 TABLET, COATED ORAL DAILY
Qty: 90 TABLET | Refills: 3 | Status: SHIPPED | OUTPATIENT
Start: 2025-05-01

## 2025-05-01 RX ORDER — VARENICLINE TARTRATE 1 MG/1
1 TABLET, FILM COATED ORAL 2 TIMES DAILY
COMMUNITY

## 2025-05-01 NOTE — PROGRESS NOTES
Follow Up Office Visit      Patient Name: Chele Khan  : 1975   MRN: [id number]     Chief Complaint:    Chief Complaint   Patient presents with    Diabetes     Pt presents for a 3 month follow up. Additional med hx includes anxiety, depression, HTN, HLD and GERD. Updated med list. Pt still taking Ozempic injections. He states he only experienced mild nausea with the first injection but no side effects now.    Daytime Sleepiness     Pt c/o excessive sleepiness during the day. He states for the past 2 months, he has been taking daily naps, which is out of his normal routine. He states he feels exhausted and not well rested       History of Present Illness: Chele Khan is a 49 y.o. male who is here today for follow up with chronic medical issues.  Patient reports he has been having sporadic episodes of chest pressure.  He denies any relieving or exacerbating factors.  He does have a history of coronary artery disease, status post MI.  He also reports excessive daytime sleepiness and fatigue.  He states he has never been worked up for sleep apnea.  Patient is requesting a new referral to PT.  He states he missed his appointment.  He continues with neck pain.  He reports good success with Chantix and is tapering down on smoking and is near cessation.  Patient states he is tolerating this somatically tied well.  He is currently on the 0.5 mg dose.    Subjective      Review of Systems:   Review of Systems   Cardiovascular:  Positive for chest pain.       The following portions of the patient's history were reviewed and updated as appropriate: allergies, current medications, past family history, past medical history, past social history, past surgical history and problem list.    Medications:     Current Outpatient Medications:     alfuzosin (UROXATRAL) 10 MG 24 hr tablet, Take 1 tablet by mouth Daily., Disp: 30 tablet, Rfl: 6    allopurinol (ZYLOPRIM) 100 MG tablet, Take 1 tablet by mouth Daily.  "Indications: Gout, Disp: 90 tablet, Rfl: 3    Aspirin Low Dose 81 MG EC tablet, Take 1 tablet by mouth Daily., Disp: 90 tablet, Rfl: 3    atorvastatin (LIPITOR) 80 MG tablet, Take 1 tablet by mouth every night at bedtime. Indications: High Amount of Fats in the Blood, Disp: 30 tablet, Rfl: 6    ezetimibe (ZETIA) 10 MG tablet, Take 1 tablet by mouth Daily., Disp: 30 tablet, Rfl: 6    gabapentin (NEURONTIN) 300 MG capsule, Take 1 capsule by mouth 3 (Three) Times a Day., Disp: 90 capsule, Rfl: 1    lisinopril (PRINIVIL,ZESTRIL) 10 MG tablet, Take 1 tablet by mouth Daily., Disp: 30 tablet, Rfl: 5    metFORMIN (GLUCOPHAGE) 1000 MG tablet, Take 1 tablet by mouth 2 (Two) Times a Day With Meals., Disp: 30 tablet, Rfl: 6    metoprolol succinate XL (Toprol XL) 50 MG 24 hr tablet, Take 1 tablet by mouth Daily., Disp: 30 tablet, Rfl: 5    omeprazole (priLOSEC) 40 MG capsule, Take 1 capsule by mouth Daily. Indications: Gastroesophageal Reflux Disease, Disp: 30 capsule, Rfl: 6    QUEtiapine (SEROquel) 100 MG tablet, Take 1 tablet by mouth Every Night., Disp: 30 tablet, Rfl: 3    Semaglutide,0.25 or 0.5MG/DOS, (OZEMPIC) 2 MG/3ML solution pen-injector, Inject 0.25 mg under the skin into the appropriate area as directed 1 (One) Time Per Week for 28 days, THEN 0.5 mg 1 (One) Time Per Week for 28 days., Disp: 4.5 mL, Rfl: 0    varenicline (CHANTIX) 1 MG tablet, Take 1 tablet by mouth 2 (Two) Times a Day., Disp: , Rfl:     venlafaxine XR (Effexor XR) 75 MG 24 hr capsule, Take 1 capsule by mouth Daily., Disp: 30 capsule, Rfl: 6    Allergies:   No Known Allergies    Objective     Physical Exam:  Vital Signs:   Vitals:    05/01/25 0818   BP: 132/92   BP Location: Left arm   Patient Position: Sitting   Cuff Size: Large Adult   Pulse: 68   SpO2: 99%   Weight: 131 kg (289 lb 11.2 oz)   Height: 188 cm (74\")     Body mass index is 37.2 kg/m².   Facility age limit for growth %evelia is 20 years.    Physical Exam  Vitals and nursing note reviewed. "   Cardiovascular:      Rate and Rhythm: Normal rate and regular rhythm.   Pulmonary:      Effort: Pulmonary effort is normal.      Breath sounds: Normal breath sounds.         Procedures    PHQ-9 Total Score: 12    Assessment / Plan      Assessment/Plan:   Assessment & Plan  Type 2 diabetes mellitus with diabetic neuropathy, without long-term current use of insulin  Patient is tolerating Ozempic well.  He is currently on the 0.5 mg.  Will check labs and titrate as needed.    Orders:    POC Albumin/Creatinine Ratio Urine    Hemoglobin A1c; Future    CBC Auto Differential; Future    Comprehensive Metabolic Panel; Future    Ambulatory Referral to Cardiology    Chest pressure  Patient has been having intermittent chest pressure.  He has a history of MI.  He has not had cardiac follow-up in a long while.  He is agreeable to establishing with cardiology for further workup.  Patient is working on tobacco cessation.  He is taking the Chantix and states he is doing well tapering down on cigarettes.  He has a history of alcoholism but has been alcohol free for 1 year.  Orders:    Ambulatory Referral to Cardiology    Acute non-ST elevation myocardial infarction (NSTEMI)  As above.    Orders:    Ambulatory Referral to Cardiology    Daytime sleepiness  Will refer to sleep medicine for evaluation.  Orders:    Ambulatory Referral to Sleep Medicine    Tobacco abuse  Improving.  Patient continues to wean down cigarettes and is near cessation.  Orders:    Ambulatory Referral to Cardiology    BPH with obstruction/lower urinary tract symptoms  Much improved on current medication.  Will continue.       Primary hypertension  Not well-controlled today.  Patient states he has forgotten his BP medication for the past 2 days.    Orders:    Ambulatory Referral to Cardiology    Mixed hyperlipidemia       Orders:    Lipid Panel; Future    Ambulatory Referral to Cardiology    Neck pain  Patient missed the call for referral to physical therapy.   He states he will update her office with another phone number.  And is requesting another referral to PT.  Orders:    Ambulatory Referral to Physical Therapy for Evaluation & Treatment    Cervical stenosis of spinal canal  As above.  Orders:    Ambulatory Referral to Physical Therapy for Evaluation & Treatment                  Follow Up:   Return in about 3 months (around 8/1/2025).      ISA Gonzalez MD  [facility]

## 2025-05-01 NOTE — ASSESSMENT & PLAN NOTE
Orders:    Lipid Panel; Future    Ambulatory Referral to Cardiology    
As above.    Orders:    Ambulatory Referral to Cardiology    
As above.  Orders:    Ambulatory Referral to Physical Therapy for Evaluation & Treatment    
Not well-controlled today.  Patient states he has forgotten his BP medication for the past 2 days.    Orders:    Ambulatory Referral to Cardiology    
Patient is tolerating Ozempic well.  He is currently on the 0.5 mg.  Will check labs and titrate as needed.    Orders:    POC Albumin/Creatinine Ratio Urine    Hemoglobin A1c; Future    CBC Auto Differential; Future    Comprehensive Metabolic Panel; Future    Ambulatory Referral to Cardiology    
Patient missed the call for referral to physical therapy.  He states he will update her office with another phone number.  And is requesting another referral to PT.  Orders:    Ambulatory Referral to Physical Therapy for Evaluation & Treatment    
18-Mar-2021 15:22

## 2025-05-01 NOTE — TELEPHONE ENCOUNTER
UNABLE TO LVM FOR PT TO CALL BACK AND SCHEDULE REFERRAL     HUB CAN SCHEDULE EARLIEST AVAILABLE APPOINTMENT

## 2025-05-02 DIAGNOSIS — E78.2 MIXED HYPERLIPIDEMIA: ICD-10-CM

## 2025-05-02 LAB
ALBUMIN SERPL-MCNC: 4.4 G/DL (ref 4.1–5.1)
ALP SERPL-CCNC: 100 IU/L (ref 44–121)
ALT SERPL-CCNC: 12 IU/L (ref 0–44)
AST SERPL-CCNC: 15 IU/L (ref 0–40)
BASOPHILS # BLD AUTO: 0.1 X10E3/UL (ref 0–0.2)
BASOPHILS NFR BLD AUTO: 1 %
BILIRUB SERPL-MCNC: 0.5 MG/DL (ref 0–1.2)
BUN SERPL-MCNC: 15 MG/DL (ref 6–24)
BUN/CREAT SERPL: 16 (ref 9–20)
CALCIUM SERPL-MCNC: 9.5 MG/DL (ref 8.7–10.2)
CHLORIDE SERPL-SCNC: 103 MMOL/L (ref 96–106)
CHOLEST SERPL-MCNC: 217 MG/DL (ref 100–199)
CO2 SERPL-SCNC: 23 MMOL/L (ref 20–29)
CREAT SERPL-MCNC: 0.96 MG/DL (ref 0.76–1.27)
EGFRCR SERPLBLD CKD-EPI 2021: 97 ML/MIN/1.73
EOSINOPHIL # BLD AUTO: 0.4 X10E3/UL (ref 0–0.4)
EOSINOPHIL NFR BLD AUTO: 6 %
ERYTHROCYTE [DISTWIDTH] IN BLOOD BY AUTOMATED COUNT: 12.7 % (ref 11.6–15.4)
GLOBULIN SER CALC-MCNC: 2.2 G/DL (ref 1.5–4.5)
GLUCOSE SERPL-MCNC: 105 MG/DL (ref 70–99)
HBA1C MFR BLD: 5.7 % (ref 4.8–5.6)
HCT VFR BLD AUTO: 53.1 % (ref 37.5–51)
HDLC SERPL-MCNC: 32 MG/DL
HGB BLD-MCNC: 17.6 G/DL (ref 13–17.7)
IMM GRANULOCYTES # BLD AUTO: 0 X10E3/UL (ref 0–0.1)
IMM GRANULOCYTES NFR BLD AUTO: 0 %
LDLC SERPL CALC-MCNC: 157 MG/DL (ref 0–99)
LYMPHOCYTES # BLD AUTO: 2 X10E3/UL (ref 0.7–3.1)
LYMPHOCYTES NFR BLD AUTO: 34 %
MCH RBC QN AUTO: 30.4 PG (ref 26.6–33)
MCHC RBC AUTO-ENTMCNC: 33.1 G/DL (ref 31.5–35.7)
MCV RBC AUTO: 92 FL (ref 79–97)
MONOCYTES # BLD AUTO: 0.4 X10E3/UL (ref 0.1–0.9)
MONOCYTES NFR BLD AUTO: 6 %
NEUTROPHILS # BLD AUTO: 3.2 X10E3/UL (ref 1.4–7)
NEUTROPHILS NFR BLD AUTO: 53 %
PLATELET # BLD AUTO: 208 X10E3/UL (ref 150–450)
POTASSIUM SERPL-SCNC: 4.4 MMOL/L (ref 3.5–5.2)
PROT SERPL-MCNC: 6.6 G/DL (ref 6–8.5)
RBC # BLD AUTO: 5.78 X10E6/UL (ref 4.14–5.8)
SODIUM SERPL-SCNC: 141 MMOL/L (ref 134–144)
TRIGL SERPL-MCNC: 152 MG/DL (ref 0–149)
VLDLC SERPL CALC-MCNC: 28 MG/DL (ref 5–40)
WBC # BLD AUTO: 6 X10E3/UL (ref 3.4–10.8)

## 2025-05-02 RX ORDER — ATORVASTATIN CALCIUM 80 MG/1
80 TABLET, FILM COATED ORAL
Qty: 30 TABLET | Refills: 2 | Status: SHIPPED | OUTPATIENT
Start: 2025-05-02

## 2025-05-02 RX ORDER — EZETIMIBE 10 MG/1
10 TABLET ORAL DAILY
Qty: 30 TABLET | Refills: 2 | Status: SHIPPED | OUTPATIENT
Start: 2025-05-02

## 2025-05-05 DIAGNOSIS — I25.10 CAD IN NATIVE ARTERY: ICD-10-CM

## 2025-05-05 DIAGNOSIS — E11.40 TYPE 2 DIABETES MELLITUS WITH DIABETIC NEUROPATHY, WITHOUT LONG-TERM CURRENT USE OF INSULIN: ICD-10-CM

## 2025-05-05 RX ORDER — CLOTRIMAZOLE 1 %
1 CREAM (GRAM) TOPICAL 2 TIMES DAILY
Qty: 113 G | Refills: 2 | Status: SHIPPED | OUTPATIENT
Start: 2025-05-05

## 2025-05-05 NOTE — TELEPHONE ENCOUNTER
ALSO NEEDS THE CREAM DISCUSSED FOR JOCK ITCH   Caller: Chele Khan    Relationship: Self    Best call back number:[phone]     Requested Prescriptions: ALSO NEEDS THE CREAM DISCUSSED FOR JOCK ITCH   Requested Prescriptions     Pending Prescriptions Disp Refills    Semaglutide,0.25 or 0.5MG/DOS, (OZEMPIC) 2 MG/3ML solution pen-injector 4.5 mL 0     Sig: Inject 0.25 mg under the skin into the appropriate area as directed 1 (One) Time Per Week for 28 days, THEN 0.5 mg 1 (One) Time Per Week for 28 days.        Pharmacy where request should be sent: [facility] #[id number] - [place], [place] - [address] AT [facility] [address] - [phone] [facility] [phone] FX     Last office visit with prescribing clinician: 5/1/2025   Last telemedicine visit with prescribing clinician: Visit date not found   Next office visit with prescribing clinician: 8/1/2025     Additional details provided by patient:     ALSO NEEDS THE CREAM DISCUSSED FOR JOCK ITCH     Does the patient have less than a 3 day supply:  [x] Yes  [] No    Would you like a call back once the refill request has been completed: [] Yes [x] No    If the office needs to give you a call back, can they leave a voicemail: [] Yes [x] No    Ernst Junior Rep   05/05/25 10:15 EDT

## 2025-05-06 ENCOUNTER — TELEPHONE (OUTPATIENT)
Dept: CARDIOLOGY | Facility: CLINIC | Age: 50
End: 2025-05-06
Payer: MEDICAID

## 2025-05-14 DIAGNOSIS — E11.40 TYPE 2 DIABETES MELLITUS WITH DIABETIC NEUROPATHY, WITHOUT LONG-TERM CURRENT USE OF INSULIN: ICD-10-CM

## 2025-05-14 RX ORDER — GABAPENTIN 300 MG/1
300 CAPSULE ORAL 3 TIMES DAILY
Qty: 90 CAPSULE | Refills: 1 | Status: SHIPPED | OUTPATIENT
Start: 2025-05-14

## 2025-05-15 ENCOUNTER — TELEPHONE (OUTPATIENT)
Dept: CARDIOLOGY | Facility: CLINIC | Age: 50
End: 2025-05-15
Payer: MEDICAID

## 2025-05-15 NOTE — TELEPHONE ENCOUNTER
UNABLE TO LVM FOR PT TO CALL BACK AND SCHEDULE REFERRAL      HUB CAN SCHEDULE EARLIEST AVAILABLE APPOINTMENT     CALLED THREE TIMES

## 2025-06-16 DIAGNOSIS — F41.9 ANXIETY: ICD-10-CM

## 2025-06-17 RX ORDER — OMEPRAZOLE 40 MG/1
40 CAPSULE, DELAYED RELEASE ORAL DAILY
Qty: 30 CAPSULE | Refills: 6 | Status: SHIPPED | OUTPATIENT
Start: 2025-06-17

## 2025-06-17 RX ORDER — VENLAFAXINE HYDROCHLORIDE 75 MG/1
75 CAPSULE, EXTENDED RELEASE ORAL DAILY
Qty: 30 CAPSULE | Refills: 6 | Status: SHIPPED | OUTPATIENT
Start: 2025-06-17

## 2025-07-25 DIAGNOSIS — Z72.0 TOBACCO ABUSE: ICD-10-CM

## 2025-07-28 RX ORDER — VARENICLINE TARTRATE 0.5 (11)-1
KIT ORAL SEE ADMIN INSTRUCTIONS
Qty: 53 EACH | Refills: 0 | Status: SHIPPED | OUTPATIENT
Start: 2025-07-28